# Patient Record
Sex: FEMALE | Race: OTHER | Employment: STUDENT | URBAN - METROPOLITAN AREA
[De-identification: names, ages, dates, MRNs, and addresses within clinical notes are randomized per-mention and may not be internally consistent; named-entity substitution may affect disease eponyms.]

---
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
## 2022-02-02 ENCOUNTER — LAB ENCOUNTER (OUTPATIENT)
Dept: LAB | Facility: HOSPITAL | Age: 22
End: 2022-02-02
Attending: INTERNAL MEDICINE
Payer: COMMERCIAL

## 2022-02-02 ENCOUNTER — OFFICE VISIT (OUTPATIENT)
Dept: RHEUMATOLOGY | Facility: CLINIC | Age: 22
End: 2022-02-02
Payer: COMMERCIAL

## 2022-02-02 VITALS
DIASTOLIC BLOOD PRESSURE: 78 MMHG | BODY MASS INDEX: 17.42 KG/M2 | HEART RATE: 86 BPM | SYSTOLIC BLOOD PRESSURE: 116 MMHG | HEIGHT: 64.25 IN | WEIGHT: 102 LBS

## 2022-02-02 DIAGNOSIS — T69.1XXA CHILBLAINS, INITIAL ENCOUNTER: ICD-10-CM

## 2022-02-02 DIAGNOSIS — I73.00 RAYNAUD'S DISEASE WITHOUT GANGRENE: ICD-10-CM

## 2022-02-02 DIAGNOSIS — T69.1XXA CHILBLAINS, INITIAL ENCOUNTER: Primary | ICD-10-CM

## 2022-02-02 DIAGNOSIS — R76.8 POSITIVE ANA (ANTINUCLEAR ANTIBODY): ICD-10-CM

## 2022-02-02 LAB
ALBUMIN SERPL-MCNC: 4.3 G/DL (ref 3.4–5)
ALBUMIN/GLOB SERPL: 1.2 {RATIO} (ref 1–2)
ALP LIVER SERPL-CCNC: 58 U/L
ALT SERPL-CCNC: 18 U/L
ANION GAP SERPL CALC-SCNC: 4 MMOL/L (ref 0–18)
AST SERPL-CCNC: 19 U/L (ref 15–37)
BILIRUB SERPL-MCNC: 0.3 MG/DL (ref 0.1–2)
BUN BLD-MCNC: 8 MG/DL (ref 7–18)
BUN/CREAT SERPL: 10.7 (ref 10–20)
C3 SERPL-MCNC: 91.9 MG/DL (ref 90–180)
C4 SERPL-MCNC: 15 MG/DL (ref 10–40)
CALCIUM BLD-MCNC: 9.7 MG/DL (ref 8.5–10.1)
CHLORIDE SERPL-SCNC: 105 MMOL/L (ref 98–112)
CK SERPL-CCNC: 208 U/L
CO2 SERPL-SCNC: 28 MMOL/L (ref 21–32)
CREAT BLD-MCNC: 0.75 MG/DL
GLOBULIN PLAS-MCNC: 3.6 G/DL (ref 2.8–4.4)
HCV AB SERPL QL IA: NONREACTIVE
OSMOLALITY SERPL CALC.SUM OF ELEC: 281 MOSM/KG (ref 275–295)
POTASSIUM SERPL-SCNC: 3.9 MMOL/L (ref 3.5–5.1)
PROT SERPL-MCNC: 7.9 G/DL (ref 6.4–8.2)
SODIUM SERPL-SCNC: 137 MMOL/L (ref 136–145)

## 2022-02-02 PROCEDURE — 86235 NUCLEAR ANTIGEN ANTIBODY: CPT

## 2022-02-02 PROCEDURE — 87389 HIV-1 AG W/HIV-1&-2 AB AG IA: CPT

## 2022-02-02 PROCEDURE — 86160 COMPLEMENT ANTIGEN: CPT | Performed by: INTERNAL MEDICINE

## 2022-02-02 PROCEDURE — 86803 HEPATITIS C AB TEST: CPT | Performed by: INTERNAL MEDICINE

## 2022-02-02 PROCEDURE — 99244 OFF/OP CNSLTJ NEW/EST MOD 40: CPT | Performed by: INTERNAL MEDICINE

## 2022-02-02 PROCEDURE — 80053 COMPREHEN METABOLIC PANEL: CPT

## 2022-02-02 PROCEDURE — 82550 ASSAY OF CK (CPK): CPT

## 2022-02-02 PROCEDURE — 86225 DNA ANTIBODY NATIVE: CPT

## 2022-02-02 PROCEDURE — 3074F SYST BP LT 130 MM HG: CPT | Performed by: INTERNAL MEDICINE

## 2022-02-02 PROCEDURE — 83516 IMMUNOASSAY NONANTIBODY: CPT

## 2022-02-02 PROCEDURE — 36415 COLL VENOUS BLD VENIPUNCTURE: CPT

## 2022-02-02 PROCEDURE — 3008F BODY MASS INDEX DOCD: CPT | Performed by: INTERNAL MEDICINE

## 2022-02-02 PROCEDURE — 86036 ANCA SCREEN EACH ANTIBODY: CPT

## 2022-02-02 PROCEDURE — 3078F DIAST BP <80 MM HG: CPT | Performed by: INTERNAL MEDICINE

## 2022-02-02 RX ORDER — ACYCLOVIR 400 MG/1
TABLET ORAL
COMMUNITY
Start: 2022-01-06

## 2022-02-02 RX ORDER — METHYLPHENIDATE HYDROCHLORIDE 18 MG/1
TABLET ORAL
COMMUNITY
Start: 2022-01-25

## 2022-02-02 NOTE — PATIENT INSTRUCTIONS
You were seen today for a positive STEPHANIE, Pernio and Raynaud's  Going to get some more blood work to evaluate for autoimmune causes

## 2022-02-04 LAB — DSDNA AB TITR SER: <10 {TITER}

## 2022-02-05 LAB
JO-1 (HISTIDY1-TRNA SYNTHETASE) AB, IGG: 0 AU/ML
MYELOPEROX ANTIBODIES, IGG: 15 AU/ML
SCLERODERMA (SCL-70) (ENA) AB, IGG: 0 AU/ML
SERINE PROTEASE 3, IGG: 1 AU/ML

## 2022-02-09 LAB
ENA SM IGG SER QL: NEGATIVE
ENA SM+RNP AB SER QL: NEGATIVE
ENA SS-A AB SER QL IA: NEGATIVE
ENA SS-B AB SER QL IA: NEGATIVE

## 2022-02-11 ENCOUNTER — TELEPHONE (OUTPATIENT)
Dept: RHEUMATOLOGY | Facility: CLINIC | Age: 22
End: 2022-02-11

## 2022-02-15 ENCOUNTER — LAB ENCOUNTER (OUTPATIENT)
Dept: LAB | Facility: HOSPITAL | Age: 22
End: 2022-02-15
Attending: INTERNAL MEDICINE
Payer: COMMERCIAL

## 2022-02-15 DIAGNOSIS — T69.1XXA CHILBLAINS, INITIAL ENCOUNTER: ICD-10-CM

## 2022-02-15 DIAGNOSIS — R76.8 POSITIVE ANA (ANTINUCLEAR ANTIBODY): ICD-10-CM

## 2022-02-15 DIAGNOSIS — I73.00 RAYNAUD'S DISEASE WITHOUT GANGRENE: ICD-10-CM

## 2022-02-15 PROCEDURE — 86235 NUCLEAR ANTIGEN ANTIBODY: CPT

## 2022-02-15 PROCEDURE — 36415 COLL VENOUS BLD VENIPUNCTURE: CPT

## 2022-02-15 PROCEDURE — 83516 IMMUNOASSAY NONANTIBODY: CPT

## 2022-03-01 LAB
EJ (GLYCYL - TRNA SYNTHETASE) ANTIBODY: NEGATIVE
FIBRILLARIN (U3 RNP) AB, IGG: NEGATIVE
JO-1 (HISTIDY1-TRNA SYNTHETASE) AB, IGG: 0 AU/ML
KU ANTIBODY: NEGATIVE
MDA5 (CADM-140) ANTIBODY: NEGATIVE
MI-2 (NUCLEAR HELICASE PROTEIN) ANTIBODY: NEGATIVE
NXP-2 (NUCLEAR MATRIX PROTEIN-2) AB: NEGATIVE
OJ (ISOLEUCYL-TRNA SYNTHETASE) ANTIBODY: NEGATIVE
P155/140 (TIF-1 GAMMA) ANTIBODY: NEGATIVE
PL-12 (ALANYL-TRNA SYNTHETASE) ANTIBODY: NEGATIVE
PL-7 (THREONYL-TRNA SYNTHETASE) ANTIBODY: NEGATIVE
PM_SCL 100 ANTIBODY, IGG: NEGATIVE
RIBONUCLEIC PROTEIN (U1) (ENA) AB, IGG: 2 UNITS
SAE1 (SUMO ACTIVATING ENZYME) ANTIBODY: NEGATIVE
SRP (SINGLE RECOGNITION PARTICLE) AB: NEGATIVE
SSA 52 (RO) (ENA) ANTIBODY, IGG: 0 AU/ML
SSA 60 (RO) (ENA) ANTIBODY, IGG: 0 AU/ML
TIF1-GAMMA ANTIBODY: NEGATIVE

## 2022-03-11 ENCOUNTER — TELEPHONE (OUTPATIENT)
Dept: RHEUMATOLOGY | Facility: CLINIC | Age: 22
End: 2022-03-11

## 2022-04-08 ENCOUNTER — OFFICE VISIT (OUTPATIENT)
Dept: INTERNAL MEDICINE CLINIC | Facility: CLINIC | Age: 22
End: 2022-04-08
Payer: COMMERCIAL

## 2022-04-08 VITALS
RESPIRATION RATE: 14 BRPM | HEIGHT: 64.25 IN | BODY MASS INDEX: 17.42 KG/M2 | TEMPERATURE: 98 F | DIASTOLIC BLOOD PRESSURE: 66 MMHG | HEART RATE: 78 BPM | WEIGHT: 102 LBS | SYSTOLIC BLOOD PRESSURE: 104 MMHG

## 2022-04-08 DIAGNOSIS — M77.8 RIGHT WRIST TENDINITIS: Primary | ICD-10-CM

## 2022-04-08 PROCEDURE — 99213 OFFICE O/P EST LOW 20 MIN: CPT | Performed by: INTERNAL MEDICINE

## 2022-04-08 PROCEDURE — 3074F SYST BP LT 130 MM HG: CPT | Performed by: INTERNAL MEDICINE

## 2022-04-08 PROCEDURE — 3078F DIAST BP <80 MM HG: CPT | Performed by: INTERNAL MEDICINE

## 2022-04-08 PROCEDURE — 3008F BODY MASS INDEX DOCD: CPT | Performed by: INTERNAL MEDICINE

## 2022-04-08 RX ORDER — CHLORAL HYDRATE 500 MG
1 CAPSULE ORAL DAILY
COMMUNITY

## 2022-04-08 RX ORDER — MELOXICAM 15 MG/1
15 TABLET ORAL DAILY
Qty: 15 TABLET | Refills: 0 | Status: SHIPPED | OUTPATIENT
Start: 2022-04-08

## 2022-04-08 NOTE — PATIENT INSTRUCTIONS
Take meloxicam daily for 1 week. Do not take any ibuprofen/advil/aspirin while taking the medication.  Take it with food    Try the wrist exercises and wear the hand splint

## 2022-05-09 ENCOUNTER — TELEPHONE (OUTPATIENT)
Dept: ORTHOPEDICS CLINIC | Facility: CLINIC | Age: 22
End: 2022-05-09

## 2022-05-09 ENCOUNTER — OFFICE VISIT (OUTPATIENT)
Dept: INTERNAL MEDICINE CLINIC | Facility: CLINIC | Age: 22
End: 2022-05-09
Payer: COMMERCIAL

## 2022-05-09 VITALS
DIASTOLIC BLOOD PRESSURE: 56 MMHG | WEIGHT: 102 LBS | OXYGEN SATURATION: 98 % | RESPIRATION RATE: 12 BRPM | HEART RATE: 74 BPM | HEIGHT: 64.75 IN | SYSTOLIC BLOOD PRESSURE: 93 MMHG | BODY MASS INDEX: 17.2 KG/M2 | TEMPERATURE: 99 F

## 2022-05-09 DIAGNOSIS — M77.8 RIGHT WRIST TENDINITIS: ICD-10-CM

## 2022-05-09 DIAGNOSIS — R73.03 PREDIABETES: ICD-10-CM

## 2022-05-09 DIAGNOSIS — Z00.00 ROUTINE PHYSICAL EXAMINATION: Primary | ICD-10-CM

## 2022-05-09 LAB — CARTRIDGE LOT#: NORMAL NUMERIC

## 2022-05-09 RX ORDER — MELOXICAM 15 MG/1
15 TABLET ORAL DAILY
Qty: 30 TABLET | Refills: 0 | Status: SHIPPED | OUTPATIENT
Start: 2022-05-09

## 2022-05-09 NOTE — TELEPHONE ENCOUNTER
Please enter an [address] for a RIGHT WRIST for  this patient's upcoming appointment, as the patient has not had any imaging completed. Patient was instructed to get X-rays before appt. PLEASE REPLY TO THIS MESSAGE when the order is put in EPIC so that I can schedule the Xray appt. Thank you, in advance!       Future Appointments   Date Time Provider Srinivasan Melvin   5/10/2022 10:20 AM ALLEGRA Henry [facility]

## 2022-05-09 NOTE — TELEPHONE ENCOUNTER
Reviewed patients chart, xray orders are required. Order placed for rt wrist xrays.  Please contact patient advise to arrive 30 mins prior to patients appt to complete x-ray order and schedule patients xray appt-Thank you

## 2022-05-09 NOTE — PATIENT INSTRUCTIONS
Please schedule an appointment with Leif Armendariz   Continue the home exercises and only take meloxicam as needed    See you back in 1 year

## 2022-05-10 ENCOUNTER — HOSPITAL ENCOUNTER (OUTPATIENT)
Dept: GENERAL RADIOLOGY | Age: 22
Discharge: HOME | End: 2022-05-10
Attending: PHYSICIAN ASSISTANT
Payer: COMMERCIAL

## 2022-05-10 ENCOUNTER — OFFICE VISIT (OUTPATIENT)
Dept: ORTHOPEDICS CLINIC | Facility: CLINIC | Age: 22
End: 2022-05-10
Payer: COMMERCIAL

## 2022-05-10 VITALS — WEIGHT: 102 LBS | HEIGHT: 64.75 IN | BODY MASS INDEX: 17.2 KG/M2

## 2022-05-10 DIAGNOSIS — M65.4 TENOSYNOVITIS OF RADIAL STYLOID: Primary | ICD-10-CM

## 2022-05-10 DIAGNOSIS — M25.531 RIGHT WRIST PAIN: ICD-10-CM

## 2022-05-10 PROCEDURE — 99204 OFFICE O/P NEW MOD 45 MIN: CPT | Performed by: PHYSICIAN ASSISTANT

## 2022-05-10 PROCEDURE — 73110 X-RAY EXAM OF WRIST: CPT | Performed by: PHYSICIAN ASSISTANT

## 2022-05-10 PROCEDURE — 20550 NJX 1 TENDON SHEATH/LIGAMENT: CPT | Performed by: PHYSICIAN ASSISTANT

## 2022-05-10 PROCEDURE — 3008F BODY MASS INDEX DOCD: CPT | Performed by: PHYSICIAN ASSISTANT

## 2022-05-10 RX ORDER — BETAMETHASONE SODIUM PHOSPHATE AND BETAMETHASONE ACETATE 3; 3 MG/ML; MG/ML
6 INJECTION, SUSPENSION INTRA-ARTICULAR; INTRALESIONAL; INTRAMUSCULAR; SOFT TISSUE ONCE
Status: COMPLETED | OUTPATIENT
Start: 2022-05-10 | End: 2022-05-10

## 2022-05-10 RX ORDER — BETAMETHASONE SODIUM PHOSPHATE AND BETAMETHASONE ACETATE 3; 3 MG/ML; MG/ML
12 INJECTION, SUSPENSION INTRA-ARTICULAR; INTRALESIONAL; INTRAMUSCULAR; SOFT TISSUE ONCE
Status: DISCONTINUED | OUTPATIENT
Start: 2022-05-10 | End: 2022-05-10

## 2022-05-10 RX ADMIN — BETAMETHASONE SODIUM PHOSPHATE AND BETAMETHASONE ACETATE 6 MG: 3; 3 INJECTION, SUSPENSION INTRA-ARTICULAR; INTRALESIONAL; INTRAMUSCULAR; SOFT TISSUE at 11:28:00

## 2022-06-27 ENCOUNTER — PATIENT MESSAGE (OUTPATIENT)
Dept: INTERNAL MEDICINE CLINIC | Facility: CLINIC | Age: 22
End: 2022-06-27

## 2022-06-27 RX ORDER — ACYCLOVIR 400 MG/1
400 TABLET ORAL 2 TIMES DAILY
Qty: 20 TABLET | Refills: 0 | Status: SHIPPED | OUTPATIENT
Start: 2022-06-27

## 2022-06-27 NOTE — TELEPHONE ENCOUNTER
From: Sidney Rousseau  To: [address] [place] [address]  Sent: 6/27/2022 8:25 AM CDT  Subject: acyclovir 400mg refill    hello, my current acycl[facility]. Instead of having to schedule an appointment with them, I wanted to know if you could refill this prescription.

## 2022-09-09 NOTE — TELEPHONE ENCOUNTER
Called patient back and discussed results with her. She has biopsy-proven hernia and evaluate for autoimmune cause. Blood work was negative. She states her symptoms have improved. Also has Raynaud's. Advised that we can try calcium channel blocker. She states that she will hold off right now.   She will follow-up in 6 to 8 months
Patient is requesting a call to discuss lab results from 2/15.  Patient is not sure what her next step should be
Please see below. Pt did review results via My Chart.
Detail Level: Generalized
Detail Level: Zone
Detail Level: Detailed

## 2023-02-06 ENCOUNTER — TELEPHONE (OUTPATIENT)
Dept: INTERNAL MEDICINE CLINIC | Facility: CLINIC | Age: 23
End: 2023-02-06

## 2023-02-06 NOTE — TELEPHONE ENCOUNTER
Pt scheduled VV for ADHD med refill, ok to keep as a VV or would you like to see pt in office ?     Last OV 5/9/22    Future Appointments   Date Time Provider Srinivasan Melvin   2/14/2023 10:30 AM Zander Mcgowan,  [facility] [place]

## 2023-02-14 PROBLEM — F90.9 ATTENTION DEFICIT HYPERACTIVITY DISORDER (ADHD): Status: ACTIVE | Noted: 2023-02-14

## 2023-10-12 ENCOUNTER — OFFICE VISIT (OUTPATIENT)
Dept: INTERNAL MEDICINE CLINIC | Facility: CLINIC | Age: 23
End: 2023-10-12
Payer: COMMERCIAL

## 2023-10-12 ENCOUNTER — LAB ENCOUNTER (OUTPATIENT)
Dept: LAB | Age: 23
End: 2023-10-12
Attending: INTERNAL MEDICINE
Payer: COMMERCIAL

## 2023-10-12 VITALS
OXYGEN SATURATION: 100 % | RESPIRATION RATE: 14 BRPM | DIASTOLIC BLOOD PRESSURE: 64 MMHG | WEIGHT: 110.19 LBS | SYSTOLIC BLOOD PRESSURE: 106 MMHG | TEMPERATURE: 98 F | BODY MASS INDEX: 18.58 KG/M2 | HEIGHT: 64.75 IN | HEART RATE: 84 BPM

## 2023-10-12 DIAGNOSIS — M79.642 BILATERAL HAND PAIN: ICD-10-CM

## 2023-10-12 DIAGNOSIS — Z11.3 SCREENING EXAMINATION FOR STD (SEXUALLY TRANSMITTED DISEASE): ICD-10-CM

## 2023-10-12 DIAGNOSIS — Z00.00 ROUTINE PHYSICAL EXAMINATION: Primary | ICD-10-CM

## 2023-10-12 DIAGNOSIS — M79.641 BILATERAL HAND PAIN: ICD-10-CM

## 2023-10-12 DIAGNOSIS — Z12.4 SCREENING FOR CERVICAL CANCER: ICD-10-CM

## 2023-10-12 DIAGNOSIS — E55.9 VITAMIN D DEFICIENCY: ICD-10-CM

## 2023-10-12 LAB
CRP SERPL-MCNC: <0.29 MG/DL (ref ?–0.3)
ERYTHROCYTE [SEDIMENTATION RATE] IN BLOOD: 38 MM/HR
HBV SURFACE AG SER-ACNC: <0.1 [IU]/L
HBV SURFACE AG SERPL QL IA: NONREACTIVE
HCV AB SERPL QL IA: NONREACTIVE
RHEUMATOID FACT SERPL-ACNC: <10 IU/ML (ref ?–15)
T PALLIDUM AB SER QL IA: NONREACTIVE
VIT D+METAB SERPL-MCNC: 42.1 NG/ML (ref 30–100)

## 2023-10-12 PROCEDURE — 86200 CCP ANTIBODY: CPT | Performed by: INTERNAL MEDICINE

## 2023-10-12 PROCEDURE — 90471 IMMUNIZATION ADMIN: CPT | Performed by: INTERNAL MEDICINE

## 2023-10-12 PROCEDURE — 87591 N.GONORRHOEAE DNA AMP PROB: CPT | Performed by: INTERNAL MEDICINE

## 2023-10-12 PROCEDURE — 86431 RHEUMATOID FACTOR QUANT: CPT | Performed by: INTERNAL MEDICINE

## 2023-10-12 PROCEDURE — 86803 HEPATITIS C AB TEST: CPT | Performed by: INTERNAL MEDICINE

## 2023-10-12 PROCEDURE — 99395 PREV VISIT EST AGE 18-39: CPT | Performed by: INTERNAL MEDICINE

## 2023-10-12 PROCEDURE — 99213 OFFICE O/P EST LOW 20 MIN: CPT | Performed by: INTERNAL MEDICINE

## 2023-10-12 PROCEDURE — 3008F BODY MASS INDEX DOCD: CPT | Performed by: INTERNAL MEDICINE

## 2023-10-12 PROCEDURE — 87340 HEPATITIS B SURFACE AG IA: CPT | Performed by: INTERNAL MEDICINE

## 2023-10-12 PROCEDURE — 86780 TREPONEMA PALLIDUM: CPT | Performed by: INTERNAL MEDICINE

## 2023-10-12 PROCEDURE — 87389 HIV-1 AG W/HIV-1&-2 AB AG IA: CPT | Performed by: INTERNAL MEDICINE

## 2023-10-12 PROCEDURE — 3074F SYST BP LT 130 MM HG: CPT | Performed by: INTERNAL MEDICINE

## 2023-10-12 PROCEDURE — 85652 RBC SED RATE AUTOMATED: CPT | Performed by: INTERNAL MEDICINE

## 2023-10-12 PROCEDURE — 87491 CHLMYD TRACH DNA AMP PROBE: CPT | Performed by: INTERNAL MEDICINE

## 2023-10-12 PROCEDURE — 86140 C-REACTIVE PROTEIN: CPT | Performed by: INTERNAL MEDICINE

## 2023-10-12 PROCEDURE — 3078F DIAST BP <80 MM HG: CPT | Performed by: INTERNAL MEDICINE

## 2023-10-12 PROCEDURE — 82306 VITAMIN D 25 HYDROXY: CPT | Performed by: INTERNAL MEDICINE

## 2023-10-12 PROCEDURE — 90686 IIV4 VACC NO PRSV 0.5 ML IM: CPT | Performed by: INTERNAL MEDICINE

## 2023-10-12 NOTE — PATIENT INSTRUCTIONS
I have ordered blood tests for you    You received the flu vaccine today.  May cause some soreness of the arm    Check to see if you have received the Tetanus vaccine in the last 10 years    Please have blood tests done    We did the pap today and may take a few days for the results to come back    I will contact you with the results     Please take 2000 IU of vitamin D daily

## 2023-10-13 LAB
C TRACH DNA SPEC QL NAA+PROBE: NEGATIVE
CCP IGG SERPL-ACNC: 3.8 U/ML (ref 0–6.9)
N GONORRHOEA DNA SPEC QL NAA+PROBE: NEGATIVE

## 2023-10-16 DIAGNOSIS — M25.541 ARTHRALGIA OF BOTH HANDS: Primary | ICD-10-CM

## 2023-10-16 DIAGNOSIS — M25.542 ARTHRALGIA OF BOTH HANDS: Primary | ICD-10-CM

## 2023-10-16 RX ORDER — PREDNISONE 10 MG/1
TABLET ORAL
Qty: 21 TABLET | Refills: 0 | Status: SHIPPED | OUTPATIENT
Start: 2023-10-16

## 2023-10-18 LAB
.: ABNORMAL
.: ABNORMAL

## 2023-10-19 PROBLEM — R87.612 LGSIL ON PAP SMEAR OF CERVIX: Status: ACTIVE | Noted: 2023-10-19

## 2023-12-12 ENCOUNTER — OFFICE VISIT (OUTPATIENT)
Dept: INTERNAL MEDICINE CLINIC | Facility: CLINIC | Age: 23
End: 2023-12-12
Payer: COMMERCIAL

## 2023-12-12 VITALS
DIASTOLIC BLOOD PRESSURE: 60 MMHG | WEIGHT: 110 LBS | HEIGHT: 64.75 IN | HEART RATE: 72 BPM | TEMPERATURE: 98 F | OXYGEN SATURATION: 99 % | SYSTOLIC BLOOD PRESSURE: 108 MMHG | RESPIRATION RATE: 14 BRPM | BODY MASS INDEX: 18.55 KG/M2

## 2023-12-12 DIAGNOSIS — R87.612 LGSIL ON PAP SMEAR OF CERVIX: ICD-10-CM

## 2023-12-12 DIAGNOSIS — I73.00 RAYNAUD'S PHENOMENON WITHOUT GANGRENE: Primary | ICD-10-CM

## 2023-12-12 PROCEDURE — 3078F DIAST BP <80 MM HG: CPT | Performed by: INTERNAL MEDICINE

## 2023-12-12 PROCEDURE — 99214 OFFICE O/P EST MOD 30 MIN: CPT | Performed by: INTERNAL MEDICINE

## 2023-12-12 PROCEDURE — 90471 IMMUNIZATION ADMIN: CPT | Performed by: INTERNAL MEDICINE

## 2023-12-12 PROCEDURE — 90715 TDAP VACCINE 7 YRS/> IM: CPT | Performed by: INTERNAL MEDICINE

## 2023-12-12 PROCEDURE — 3074F SYST BP LT 130 MM HG: CPT | Performed by: INTERNAL MEDICINE

## 2023-12-12 PROCEDURE — 3008F BODY MASS INDEX DOCD: CPT | Performed by: INTERNAL MEDICINE

## 2023-12-12 RX ORDER — AMLODIPINE BESYLATE 5 MG/1
2.5 TABLET ORAL DAILY
Qty: 15 TABLET | Refills: 0 | Status: SHIPPED | OUTPATIENT
Start: 2023-12-12 | End: 2024-12-06

## 2023-12-12 NOTE — PATIENT INSTRUCTIONS
Take half a tablet of the medication provided for 1 month and let me know if any improvement in your symptoms.   If no improvement I also recommend following up with your rheumatologist    You received the tetanus vaccine today and it may cause soreness for 24 hours for which you may take some ibuprofen 400 mg or Tylenol 500 mg

## 2023-12-13 DIAGNOSIS — I73.00 RAYNAUD'S PHENOMENON WITHOUT GANGRENE: ICD-10-CM

## 2023-12-13 RX ORDER — AMLODIPINE BESYLATE 5 MG/1
2.5 TABLET ORAL DAILY
Qty: 45 TABLET | Refills: 0 | OUTPATIENT
Start: 2023-12-13 | End: 2024-12-07

## 2024-01-14 ENCOUNTER — PATIENT MESSAGE (OUTPATIENT)
Dept: INTERNAL MEDICINE CLINIC | Facility: CLINIC | Age: 24
End: 2024-01-14

## 2024-01-14 DIAGNOSIS — M77.8 RIGHT WRIST TENDINITIS: Primary | ICD-10-CM

## 2024-01-15 NOTE — TELEPHONE ENCOUNTER
From: Maude Dougherty  To: Holly Flores  Sent: 1/14/2024 7:45 AM CST  Subject: Shoulder pain     Hello, I am still experiencing pretty intense pain in my shoulder/arm and weakness during the night and morning. I think it must again be due to inflammation - I’m not sure if it is bursitis/tendonitis but I think I need another cortisol shot. Can you help me with this referral?

## 2024-02-05 ENCOUNTER — LAB ENCOUNTER (OUTPATIENT)
Dept: LAB | Facility: HOSPITAL | Age: 24
End: 2024-02-05
Attending: INTERNAL MEDICINE
Payer: COMMERCIAL

## 2024-02-05 ENCOUNTER — OFFICE VISIT (OUTPATIENT)
Dept: RHEUMATOLOGY | Facility: CLINIC | Age: 24
End: 2024-02-05

## 2024-02-05 VITALS
SYSTOLIC BLOOD PRESSURE: 118 MMHG | HEIGHT: 64.75 IN | DIASTOLIC BLOOD PRESSURE: 78 MMHG | WEIGHT: 111 LBS | HEART RATE: 101 BPM | BODY MASS INDEX: 18.72 KG/M2

## 2024-02-05 DIAGNOSIS — T69.1XXA CHILBLAINS, INITIAL ENCOUNTER: ICD-10-CM

## 2024-02-05 DIAGNOSIS — M35.9 DIFFUSE DISEASE OF CONNECTIVE TISSUE (HCC): ICD-10-CM

## 2024-02-05 DIAGNOSIS — M35.9 UNDIFFERENTIATED CONNECTIVE TISSUE DISEASE (HCC): ICD-10-CM

## 2024-02-05 DIAGNOSIS — R76.8 POSITIVE ANA (ANTINUCLEAR ANTIBODY): Primary | ICD-10-CM

## 2024-02-05 DIAGNOSIS — I73.00 RAYNAUD'S SYNDROME: Primary | ICD-10-CM

## 2024-02-05 DIAGNOSIS — I73.00 RAYNAUD'S DISEASE WITHOUT GANGRENE: ICD-10-CM

## 2024-02-05 LAB
BILIRUB UR QL: NEGATIVE
GLUCOSE UR-MCNC: NORMAL MG/DL
HGB UR QL STRIP.AUTO: NEGATIVE
KETONES UR-MCNC: 10 MG/DL
LEUKOCYTE ESTERASE UR QL STRIP.AUTO: NEGATIVE
NITRITE UR QL STRIP.AUTO: NEGATIVE
PH UR: 7 [PH] (ref 5–8)
PROT UR-MCNC: NEGATIVE MG/DL
SP GR UR STRIP: 1.01 (ref 1–1.03)
UROBILINOGEN UR STRIP-ACNC: NORMAL

## 2024-02-05 PROCEDURE — 3074F SYST BP LT 130 MM HG: CPT | Performed by: INTERNAL MEDICINE

## 2024-02-05 PROCEDURE — 99214 OFFICE O/P EST MOD 30 MIN: CPT | Performed by: INTERNAL MEDICINE

## 2024-02-05 PROCEDURE — 81001 URINALYSIS AUTO W/SCOPE: CPT | Performed by: INTERNAL MEDICINE

## 2024-02-05 PROCEDURE — 3008F BODY MASS INDEX DOCD: CPT | Performed by: INTERNAL MEDICINE

## 2024-02-05 PROCEDURE — 3078F DIAST BP <80 MM HG: CPT | Performed by: INTERNAL MEDICINE

## 2024-02-05 RX ORDER — PREDNISONE 10 MG/1
TABLET ORAL
Qty: 20 TABLET | Refills: 0 | Status: SHIPPED
Start: 2024-02-05

## 2024-02-05 NOTE — PROGRESS NOTES
Maude Dougherty is a 23 year old female.    HPI:     Chief Complaint   Patient presents with    Joint Pain    Hand Pain       I had the pleasure of seeing Maude Dougherty on 2/5/2024 for follow up +STEPHANIE, chillblains, RP and now worsening joint pain      Current Medications:  Meloxicam as needed   Blood work:  +STEPHANIE 1:80 speckled pattern, neg LEWIS panel  +cryoglobulin, cryocrit 0.3%, +IgG, IgA, IgM, kappa and C3  Neg RF, CCP, APL panel, SPEP, ANCA, myositis panel  Neg cryofibrinigon, cold hemaglutins, monoclonal protein    Interval History:  This is a 20 yo F with no known medical history referred for a +STEPHANIE.  She was following with dermatology due to rash on her hands that developed around November.  It was mostly in her right hand on the palmar aspect of there fingers.  She had a biopsy done that was consistent with perniosis. She reports having some pain with the lesions mostly in the morning. Did not notice if cold weather worsened it.   She also developed Raynaud's symptoms about 1 month ago.  It is induced by the cold.  About a year ago in December 2020 she also had erythema on her nose and her cheeks.  It lasted a few weeks.  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  She currently is on valacyclovir twice a day.  This happened in November.  Denies any or symptoms of sicca, hair loss, lymphadenopathy, fever chills, serositis, DVT or PE, miscarriages, chest pain or shortness of breath.  No family history of autoimmune diseases.  She was found to have a positive STEPHANIE 1: 80 speckled pattern and + cryoglobulin.  Denies any petechia or purpura lesions.  No history of hepatitis C or HIV.  Denies any joint pain or swelling    2/5/2024:  Presents for f/u of +STEPHANIE, chillblains/perniosis, Raynaunds  Having more joint pain ninfa at night and in the morning. Feels very stiff, at time hard to move the covers  R hand can become swollen  Has a hx of right wrist tendonitis over 2 years ago and received an  injection which helped, this was in May 2022  Has had pain in the shoulders  Knees can hurt in the morning but not severe  No psoriasis but does have a dry scalp  No sores in the mouth, hair loss. Some dry eyes  Taking meloxicam as needed, helps slightly   Recent blood work showed elevated ESR 38          HISTORY:  No past medical history on file.   Social Hx Reviewed   Family Hx Reviewed     Medications (Active prior to today's visit):  Current Outpatient Medications   Medication Sig Dispense Refill    amphetamine-dextroamphetamine 20 MG Oral Tab Take 0.5 tablet (10 mg) by mouth 2 (two) times daily with meals (Patient not taking: Reported on 2/5/2024) 30 tablet 0    [START ON 11/18/2024] amphetamine-dextroamphetamine (ADDERALL) 10 MG Oral Tab Take 1 tablet (10 mg total) by mouth 2 (two) times daily. 60 tablet 0    Multiple Vitamins-Minerals (WOMENS MULTIVITAMIN) Oral Tab Take 1 tablet by mouth daily.      Omega-3 1000 MG Oral Cap Take 1 capsule by mouth daily.      MAGNESIUM OR Take 1 tablet by mouth daily.      amphetamine-dextroamphetamine (ADDERALL) 10 MG Oral Tab Take 1 tablet (10 mg total) by mouth 2 (two) times daily with meals. (Patient not taking: Reported on 2/5/2024) 60 tablet 0    amLODIPine 5 MG Oral Tab Take 0.5 tablets (2.5 mg total) by mouth daily. (Patient not taking: Reported on 2/5/2024) 15 tablet 0    amphetamine-dextroamphetamine (ADDERALL) 10 MG Oral Tab Take 1 tablet (10 mg total) by mouth 2 (two) times daily. (Patient not taking: Reported on 2/5/2024) 60 tablet 0    [START ON 2/12/2024] amphetamine-dextroamphetamine (ADDERALL) 10 MG Oral Tab Take 1 tablet (10 mg total) by mouth 2 (two) times daily. (Patient not taking: Reported on 2/5/2024) 60 tablet 0    Lysine 1000 MG Oral Tab Take 1 tablet by mouth daily. (Patient not taking: Reported on 2/5/2024)       .cmed  Allergies:  No Known Allergies      ROS:   All other ROS are negative.     PHYSICAL EXAM:   GEN: AAOx3, NAD  HEENT: EOMI, PERRLA,  no injection or icterus, oral mucosa moist, no oral lesions. No lymphadenopathy. No facial rash  CVS: RRR, no murmurs rubs or gallops. Equal 2+ distal pulses.   LUNGS: CTAB, no increased work of breathing  ABDOMEN:  soft NT/ND, +BS, no HSM  SKIN: No rashes or skin lesions. No nail findings  MSK:  R hand palmar aspect small erythematous lesions, nontender    NEURO: Cranial nerves II-XII intact grossly. 5/5 strength throughout in both upper and lower extremities, sensation intact.  PSYCH: normal mood       LABS:     Component      Latest Ref Rng 2/2/2022   MYELOPEROX ANTIBODIES, IGG      0 - 19 AU/mL 15    SERINE PROTEASE3, IGG      0 - 19 AU/mL 1    ANCA IFA Titer      <1:20  <1:20    ANCA IFA Pattern      None Detected  None Detected    COMPLEMENT C4      10.0 - 40.0 mg/dL 15.0    COMPLEMENT C3      90.0 - 180.0 mg/dL 91.9    Anti Double Strand DNA      <10  <10    Anti-Rogers/RNP Antibody      Negative  Negative    Anti-Smith Antibody      Negative  Negative    Anti-Sjogren's A      Negative  Negative    Anti-Sjogren's B      Negative  Negative    Scleroderma (Scl-70) (LEWIS) Antibody, IgG      0 - 40 AU/mL 0    CK      26 - 192 U/L 208 (H)       Component      Latest Ref Rng 10/12/2023   RHEUMATOID FACTOR      <15 IU/mL <10    C-Citrullinated Peptide IgG AB      0.0 - 6.9 U/mL 3.8    SED RATE      0 - 20 mm/Hr 38 (H)    C-REACTIVE PROTEIN      <0.30 mg/dL <0.29           Imaging:     XR R wrist  CONCLUSION:  Negative     ASSESSMENT/PLAN:     UCTD  - She has a positive STEPHANIE, chilblains/perniosis, Raynaud's and worsening joint pain.  Has more pain in her right hand and shoulders.  - Right wrist tendonitis over 2 years ago and received an injection which helped, this was in May 2022  - Plan to repeat blood work, STEPHANIE with LEWIS panel, C3 and C4, urinalysis  - Recent blood work in October 2023 showed negative RF and CCP but mildly elevated ESR of 38  - Plan to start prednisone 3 mg daily for 3 days then 20 mg daily for 3  days then 10 mg daily for 3 days and stop  - Depending the results may consider starting hydroxychloroquine.    Pt will f/u in 2-3 mos     Viktoriya Waldrop MD  2/5/2024   3:16 PM

## 2024-02-05 NOTE — PATIENT INSTRUCTIONS
You were seen positive STEPHANIE, chilblains, Raynaud's and worsening joint pain  Symptoms may be from undifferentiated connective tissue disease which can cause many symptoms  Lets get some more blood work  Try prednisone 3 pills for 3 days then 2 pills for 3 days then 1 pill for 3 days then stop  In the meantime we will likely start you on hydroxychloroquine 1 pill a day but let me get blood work first  Please make a follow-up in the next 3 months

## 2024-02-06 ENCOUNTER — LAB ENCOUNTER (OUTPATIENT)
Dept: LAB | Age: 24
End: 2024-02-06
Attending: INTERNAL MEDICINE
Payer: COMMERCIAL

## 2024-02-06 DIAGNOSIS — R76.8 POSITIVE ANA (ANTINUCLEAR ANTIBODY): ICD-10-CM

## 2024-02-06 DIAGNOSIS — M35.9 UNDIFFERENTIATED CONNECTIVE TISSUE DISEASE (HCC): ICD-10-CM

## 2024-02-06 DIAGNOSIS — I73.00 RAYNAUD'S DISEASE WITHOUT GANGRENE: ICD-10-CM

## 2024-02-06 DIAGNOSIS — I73.00 RAYNAUD'S SYNDROME: ICD-10-CM

## 2024-02-06 DIAGNOSIS — M35.9 DIFFUSE DISEASE OF CONNECTIVE TISSUE (HCC): ICD-10-CM

## 2024-02-06 DIAGNOSIS — T69.1XXA CHILBLAINS, INITIAL ENCOUNTER: ICD-10-CM

## 2024-02-06 LAB
ALBUMIN SERPL-MCNC: 4.1 G/DL (ref 3.4–5)
ALBUMIN/GLOB SERPL: 1.1 {RATIO} (ref 1–2)
ALP LIVER SERPL-CCNC: 68 U/L
ALT SERPL-CCNC: 17 U/L
ANION GAP SERPL CALC-SCNC: 3 MMOL/L (ref 0–18)
AST SERPL-CCNC: 22 U/L (ref 15–37)
BASOPHILS # BLD AUTO: 0.03 X10(3) UL (ref 0–0.2)
BASOPHILS NFR BLD AUTO: 0.8 %
BILIRUB SERPL-MCNC: 0.4 MG/DL (ref 0.1–2)
BUN BLD-MCNC: 11 MG/DL (ref 9–23)
C3 SERPL-MCNC: 75.2 MG/DL (ref 90–180)
C4 SERPL-MCNC: 16 MG/DL (ref 10–40)
CALCIUM BLD-MCNC: 9.8 MG/DL (ref 8.5–10.1)
CHLORIDE SERPL-SCNC: 109 MMOL/L (ref 98–112)
CO2 SERPL-SCNC: 27 MMOL/L (ref 21–32)
CREAT BLD-MCNC: 0.93 MG/DL
EGFRCR SERPLBLD CKD-EPI 2021: 90 ML/MIN/1.73M2 (ref 60–?)
EOSINOPHIL # BLD AUTO: 0.04 X10(3) UL (ref 0–0.7)
EOSINOPHIL NFR BLD AUTO: 1.1 %
ERYTHROCYTE [DISTWIDTH] IN BLOOD BY AUTOMATED COUNT: 12.7 %
FASTING STATUS PATIENT QL REPORTED: NO
GLOBULIN PLAS-MCNC: 3.9 G/DL (ref 2.8–4.4)
GLUCOSE BLD-MCNC: 90 MG/DL (ref 70–99)
HCT VFR BLD AUTO: 41.1 %
HGB BLD-MCNC: 13.4 G/DL
IMM GRANULOCYTES # BLD AUTO: 0.01 X10(3) UL (ref 0–1)
IMM GRANULOCYTES NFR BLD: 0.3 %
LYMPHOCYTES # BLD AUTO: 1.69 X10(3) UL (ref 1–4)
LYMPHOCYTES NFR BLD AUTO: 45.1 %
MCH RBC QN AUTO: 28.3 PG (ref 26–34)
MCHC RBC AUTO-ENTMCNC: 32.6 G/DL (ref 31–37)
MCV RBC AUTO: 86.7 FL
MONOCYTES # BLD AUTO: 0.45 X10(3) UL (ref 0.1–1)
MONOCYTES NFR BLD AUTO: 12 %
NEUTROPHILS # BLD AUTO: 1.53 X10 (3) UL (ref 1.5–7.7)
NEUTROPHILS # BLD AUTO: 1.53 X10(3) UL (ref 1.5–7.7)
NEUTROPHILS NFR BLD AUTO: 40.7 %
OSMOLALITY SERPL CALC.SUM OF ELEC: 287 MOSM/KG (ref 275–295)
PLATELET # BLD AUTO: 201 10(3)UL (ref 150–450)
POTASSIUM SERPL-SCNC: 3.9 MMOL/L (ref 3.5–5.1)
PROT SERPL-MCNC: 8 G/DL (ref 6.4–8.2)
RBC # BLD AUTO: 4.74 X10(6)UL
SODIUM SERPL-SCNC: 139 MMOL/L (ref 136–145)
WBC # BLD AUTO: 3.8 X10(3) UL (ref 4–11)

## 2024-02-06 PROCEDURE — 86225 DNA ANTIBODY NATIVE: CPT

## 2024-02-06 PROCEDURE — 85025 COMPLETE CBC W/AUTO DIFF WBC: CPT

## 2024-02-06 PROCEDURE — 80053 COMPREHEN METABOLIC PANEL: CPT

## 2024-02-06 PROCEDURE — 86038 ANTINUCLEAR ANTIBODIES: CPT

## 2024-02-06 PROCEDURE — 86235 NUCLEAR ANTIGEN ANTIBODY: CPT

## 2024-02-06 PROCEDURE — 86160 COMPLEMENT ANTIGEN: CPT

## 2024-02-06 PROCEDURE — 36415 COLL VENOUS BLD VENIPUNCTURE: CPT

## 2024-02-06 PROCEDURE — 86039 ANTINUCLEAR ANTIBODIES (ANA): CPT

## 2024-02-08 LAB
ANA NUCLEOLAR TITR SER IF: 320 {TITER}
CENTROMERE IGG SER-ACNC: 0.5 U/ML
DSDNA AB TITR SER: 40 {TITER}
ENA JO1 AB SER IA-ACNC: <0.3 U/ML
ENA RNP IGG SER IA-ACNC: 3.5 U/ML
ENA SCL70 IGG SER IA-ACNC: 1.6 U/ML
ENA SM IGG SER IA-ACNC: 0.7 U/ML
ENA SS-A IGG SER IA-ACNC: 0.6 U/ML
ENA SS-B IGG SER IA-ACNC: 0.5 U/ML
NUCLEAR IGG TITR SER IF: POSITIVE {TITER}
U1 SNRNP IGG SER IA-ACNC: 3.1 U/ML

## 2024-02-12 ENCOUNTER — TELEPHONE (OUTPATIENT)
Dept: RHEUMATOLOGY | Facility: CLINIC | Age: 24
End: 2024-02-12

## 2024-02-12 RX ORDER — HYDROXYCHLOROQUINE SULFATE 200 MG/1
200 TABLET, FILM COATED ORAL DAILY
Qty: 90 TABLET | Refills: 0 | Status: SHIPPED | OUTPATIENT
Start: 2024-02-12

## 2024-03-16 ENCOUNTER — PATIENT MESSAGE (OUTPATIENT)
Dept: RHEUMATOLOGY | Facility: CLINIC | Age: 24
End: 2024-03-16

## 2024-03-18 NOTE — TELEPHONE ENCOUNTER
From: Maude Dougherty  To: Viktoriya Waldrop  Sent: 3/16/2024 1:05 PM CDT  Subject: Sending medical records     Good afternoon,   I would like to request to have my labs and notes faxed to [phone] for Doctor Pham at [facility]. Please note that I am not transferring away from your practice and still plan to follow up around May, but would also like her to review these.     Thank you,   Maude Dougherty

## 2024-04-01 ENCOUNTER — PATIENT MESSAGE (OUTPATIENT)
Dept: INTERNAL MEDICINE CLINIC | Facility: CLINIC | Age: 24
End: 2024-04-01

## 2024-04-01 DIAGNOSIS — M77.8 RIGHT WRIST TENDINITIS: ICD-10-CM

## 2024-04-01 RX ORDER — MELOXICAM 15 MG/1
15 TABLET ORAL DAILY PRN
Qty: 30 TABLET | Refills: 0 | Status: SHIPPED | OUTPATIENT
Start: 2024-04-01

## 2024-04-01 NOTE — TELEPHONE ENCOUNTER
From: Maude Dougherty  To: Holly Flores  Sent: 4/1/2024 12:46 PM CDT  Subject: Meloxicam refill    Good afternoon,   Would it be possible to refill my meloxicam prescription? I am still experiencing a lot of joint pain in my shoulders and hands and ibuprofen has not been very effective.   Thanks,   Maude

## 2024-05-14 NOTE — TELEPHONE ENCOUNTER
LOV: 2/5/24  Last Refilled:#90, 0rfs 2/12/24    ASSESSMENT/PLAN:      UCTD  - She has a positive STEPHANIE, chilblains/perniosis, Raynaud's and worsening joint pain.  Has more pain in her right hand and shoulders.  - Right wrist tendonitis over 2 years ago and received an injection which helped, this was in May 2022  - Plan to repeat blood work, STEPHANIE with LEWIS panel, C3 and C4, urinalysis  - Recent blood work in October 2023 showed negative RF and CCP but mildly elevated ESR of 38  - Plan to start prednisone 3 mg daily for 3 days then 20 mg daily for 3 days then 10 mg daily for 3 days and stop  - Depending the results may consider starting hydroxychloroquine.     Pt will f/u in 2-3 mos      Viktoriya Waldrop MD  2/5/2024   3:16 PM  Please advise.

## 2024-05-15 RX ORDER — HYDROXYCHLOROQUINE SULFATE 200 MG/1
200 TABLET, FILM COATED ORAL DAILY
Qty: 90 TABLET | Refills: 0 | Status: SHIPPED | OUTPATIENT
Start: 2024-05-15

## 2024-06-11 ENCOUNTER — TELEPHONE (OUTPATIENT)
Dept: INTERNAL MEDICINE CLINIC | Facility: CLINIC | Age: 24
End: 2024-06-11

## 2024-06-11 ENCOUNTER — PATIENT MESSAGE (OUTPATIENT)
Dept: RHEUMATOLOGY | Facility: CLINIC | Age: 24
End: 2024-06-11

## 2024-06-11 PROBLEM — IMO0002 LUPUS: Status: ACTIVE | Noted: 2024-06-11

## 2024-06-11 PROBLEM — M32.9 LUPUS (HCC): Status: ACTIVE | Noted: 2024-06-11

## 2024-06-11 NOTE — TELEPHONE ENCOUNTER
Received a message from the patient's psychiatrist that she is having some fevers and a flareup?  For the past few days.  Can you triage her and find out what is going on.  If it is related to the lupus then she will need to follow-up with her rheumatologist    Holly Flores DO

## 2024-06-11 NOTE — TELEPHONE ENCOUNTER
Called and spoke to patient, she states she thinks it is related to the Lupus. She does not feel sick. Psychiatrist was to reach out to the Rheumatologist, explained to patient that writer unable to see if that was done and recommended patient to reach out to Rheumatologist to f/u. Patient verbalized understanding.

## 2024-06-12 RX ORDER — PREDNISONE 10 MG/1
TABLET ORAL
Qty: 20 TABLET | Refills: 0 | Status: SHIPPED | OUTPATIENT
Start: 2024-06-12

## 2024-06-12 NOTE — TELEPHONE ENCOUNTER
From: Maude Dougherty  To: Viktoriya Waldrop  Sent: 6/11/2024 10:09 PM CDT  Subject: Current flare up & fever     Hi Dr. Waldrop,   I’m currently having a flare up and have had a fever usually ranging from around 101-102 without medicine since Saturday. Also very fatigued - I am taking acetaminophen pretty regularly but since it is autoimmune related I’m not sure if I should just continue to wait it out?

## 2024-06-12 NOTE — TELEPHONE ENCOUNTER
Phoned pt. States since 6/8/24 has been running fever 101 -102. Takes acetaminophen and fever comes down but 8 hrs after taking acetaminophen, fever returns. Joint pain and swelling unchanged since onset of fever. Also has chills and fatigue. Denies sore throat, runny nose or cough. Pt refers to this as a flare up though [facility] has never had this kind in the past.

## 2024-07-01 ENCOUNTER — TELEPHONE (OUTPATIENT)
Dept: RHEUMATOLOGY | Facility: CLINIC | Age: 24
End: 2024-07-01

## 2024-07-01 DIAGNOSIS — I73.00 RAYNAUD'S DISEASE WITHOUT GANGRENE: ICD-10-CM

## 2024-07-01 DIAGNOSIS — R76.8 POSITIVE ANA (ANTINUCLEAR ANTIBODY): Primary | ICD-10-CM

## 2024-07-01 NOTE — TELEPHONE ENCOUNTER
Wrong pool     Patient went to go get labs done but the order was not there. Patient is requesting orders. Appointment is July 3rd with .

## 2024-07-01 NOTE — TELEPHONE ENCOUNTER
Patient went to go get labs done but the order was not there. Patient is requesting orders. Appointment is July 3rd with .

## 2024-07-03 ENCOUNTER — LAB ENCOUNTER (OUTPATIENT)
Dept: LAB | Facility: HOSPITAL | Age: 24
End: 2024-07-03
Attending: INTERNAL MEDICINE
Payer: COMMERCIAL

## 2024-07-03 ENCOUNTER — OFFICE VISIT (OUTPATIENT)
Dept: RHEUMATOLOGY | Facility: CLINIC | Age: 24
End: 2024-07-03
Payer: COMMERCIAL

## 2024-07-03 VITALS
SYSTOLIC BLOOD PRESSURE: 112 MMHG | WEIGHT: 107 LBS | DIASTOLIC BLOOD PRESSURE: 76 MMHG | HEIGHT: 64.75 IN | HEART RATE: 112 BPM | BODY MASS INDEX: 18.04 KG/M2

## 2024-07-03 DIAGNOSIS — Z51.81 MEDICATION MONITORING ENCOUNTER: ICD-10-CM

## 2024-07-03 DIAGNOSIS — R76.8 POSITIVE ANA (ANTINUCLEAR ANTIBODY): ICD-10-CM

## 2024-07-03 DIAGNOSIS — I73.00 RAYNAUD'S DISEASE WITHOUT GANGRENE: ICD-10-CM

## 2024-07-03 DIAGNOSIS — M32.9 SYSTEMIC LUPUS ERYTHEMATOSUS, UNSPECIFIED SLE TYPE, UNSPECIFIED ORGAN INVOLVEMENT STATUS (HCC): Primary | ICD-10-CM

## 2024-07-03 DIAGNOSIS — T69.1XXA CHILBLAINS, INITIAL ENCOUNTER: ICD-10-CM

## 2024-07-03 LAB
ALBUMIN SERPL-MCNC: 4.4 G/DL (ref 3.2–4.8)
ALBUMIN/GLOB SERPL: 1.3 {RATIO} (ref 1–2)
ALP LIVER SERPL-CCNC: 58 U/L
ALT SERPL-CCNC: 10 U/L
ANION GAP SERPL CALC-SCNC: 4 MMOL/L (ref 0–18)
AST SERPL-CCNC: 19 U/L (ref ?–34)
BASOPHILS # BLD AUTO: 0.04 X10(3) UL (ref 0–0.2)
BASOPHILS NFR BLD AUTO: 0.8 %
BILIRUB SERPL-MCNC: 0.5 MG/DL (ref 0.3–1.2)
BILIRUB UR QL: NEGATIVE
BUN BLD-MCNC: 13 MG/DL (ref 9–23)
BUN/CREAT SERPL: 14.9 (ref 10–20)
C3 SERPL-MCNC: 101.8 MG/DL (ref 82–160)
C4 SERPL-MCNC: 20.6 MG/DL (ref 12–36)
CALCIUM BLD-MCNC: 10.2 MG/DL (ref 8.7–10.4)
CHLORIDE SERPL-SCNC: 107 MMOL/L (ref 98–112)
CLARITY UR: CLEAR
CO2 SERPL-SCNC: 28 MMOL/L (ref 21–32)
CREAT BLD-MCNC: 0.87 MG/DL
CRP SERPL-MCNC: <0.4 MG/DL (ref ?–1)
DEPRECATED RDW RBC AUTO: 42.9 FL (ref 35.1–46.3)
EGFRCR SERPLBLD CKD-EPI 2021: 97 ML/MIN/1.73M2 (ref 60–?)
EOSINOPHIL # BLD AUTO: 0.08 X10(3) UL (ref 0–0.7)
EOSINOPHIL NFR BLD AUTO: 1.6 %
ERYTHROCYTE [DISTWIDTH] IN BLOOD BY AUTOMATED COUNT: 13.9 % (ref 11–15)
ERYTHROCYTE [SEDIMENTATION RATE] IN BLOOD: 34 MM/HR
FASTING STATUS PATIENT QL REPORTED: NO
GLOBULIN PLAS-MCNC: 3.3 G/DL (ref 2–3.5)
GLUCOSE BLD-MCNC: 74 MG/DL (ref 70–99)
GLUCOSE UR-MCNC: NORMAL MG/DL
HCT VFR BLD AUTO: 39 %
HGB BLD-MCNC: 13 G/DL
HGB UR QL STRIP.AUTO: NEGATIVE
IMM GRANULOCYTES # BLD AUTO: 0.01 X10(3) UL (ref 0–1)
IMM GRANULOCYTES NFR BLD: 0.2 %
KETONES UR-MCNC: NEGATIVE MG/DL
LEUKOCYTE ESTERASE UR QL STRIP.AUTO: NEGATIVE
LYMPHOCYTES # BLD AUTO: 2.31 X10(3) UL (ref 1–4)
LYMPHOCYTES NFR BLD AUTO: 45.4 %
MCH RBC QN AUTO: 28.6 PG (ref 26–34)
MCHC RBC AUTO-ENTMCNC: 33.3 G/DL (ref 31–37)
MCV RBC AUTO: 85.9 FL
MONOCYTES # BLD AUTO: 0.7 X10(3) UL (ref 0.1–1)
MONOCYTES NFR BLD AUTO: 13.8 %
NEUTROPHILS # BLD AUTO: 1.95 X10 (3) UL (ref 1.5–7.7)
NEUTROPHILS # BLD AUTO: 1.95 X10(3) UL (ref 1.5–7.7)
NEUTROPHILS NFR BLD AUTO: 38.2 %
NITRITE UR QL STRIP.AUTO: NEGATIVE
OSMOLALITY SERPL CALC.SUM OF ELEC: 287 MOSM/KG (ref 275–295)
PH UR: 7 [PH] (ref 5–8)
PLATELET # BLD AUTO: 217 10(3)UL (ref 150–450)
POTASSIUM SERPL-SCNC: 5 MMOL/L (ref 3.5–5.1)
PROT SERPL-MCNC: 7.7 G/DL (ref 5.7–8.2)
PROT UR-MCNC: NEGATIVE MG/DL
RBC # BLD AUTO: 4.54 X10(6)UL
SODIUM SERPL-SCNC: 139 MMOL/L (ref 136–145)
SP GR UR STRIP: 1.01 (ref 1–1.03)
UROBILINOGEN UR STRIP-ACNC: NORMAL
WBC # BLD AUTO: 5.1 X10(3) UL (ref 4–11)

## 2024-07-03 PROCEDURE — 36415 COLL VENOUS BLD VENIPUNCTURE: CPT

## 2024-07-03 PROCEDURE — 3078F DIAST BP <80 MM HG: CPT | Performed by: INTERNAL MEDICINE

## 2024-07-03 PROCEDURE — 86225 DNA ANTIBODY NATIVE: CPT

## 2024-07-03 PROCEDURE — 85025 COMPLETE CBC W/AUTO DIFF WBC: CPT

## 2024-07-03 PROCEDURE — 80053 COMPREHEN METABOLIC PANEL: CPT

## 2024-07-03 PROCEDURE — 3008F BODY MASS INDEX DOCD: CPT | Performed by: INTERNAL MEDICINE

## 2024-07-03 PROCEDURE — G2211 COMPLEX E/M VISIT ADD ON: HCPCS | Performed by: INTERNAL MEDICINE

## 2024-07-03 PROCEDURE — 86140 C-REACTIVE PROTEIN: CPT

## 2024-07-03 PROCEDURE — 86160 COMPLEMENT ANTIGEN: CPT

## 2024-07-03 PROCEDURE — 81003 URINALYSIS AUTO W/O SCOPE: CPT

## 2024-07-03 PROCEDURE — 3074F SYST BP LT 130 MM HG: CPT | Performed by: INTERNAL MEDICINE

## 2024-07-03 PROCEDURE — 85652 RBC SED RATE AUTOMATED: CPT

## 2024-07-03 PROCEDURE — 99214 OFFICE O/P EST MOD 30 MIN: CPT | Performed by: INTERNAL MEDICINE

## 2024-07-03 RX ORDER — HYDROXYCHLOROQUINE SULFATE 200 MG/1
200 TABLET, FILM COATED ORAL DAILY
Qty: 90 TABLET | Refills: 1 | Status: SHIPPED | OUTPATIENT
Start: 2024-07-03

## 2024-07-03 RX ORDER — PREDNISONE 10 MG/1
TABLET ORAL
Qty: 20 TABLET | Refills: 0 | Status: SHIPPED | OUTPATIENT
Start: 2024-07-03

## 2024-07-03 NOTE — PATIENT INSTRUCTIONS
You were seen today for Lupus  You still have some joint pain  For now continue hydroxychloroquine  Could consider adding more medications like azathioprine if you continue to have flares  I gave you a prednisone taper in case to flare again  Blood work today  Follow-up in 3 to 4 months        [facility]  [address] [place], [place] [address]  Phone: [phone]

## 2024-07-03 NOTE — PROGRESS NOTES
Maude Dougherty is a 24 year old female.    HPI:     Chief Complaint   Patient presents with    Follow - Up     Positive STEPHANIE       I had the pleasure of seeing Maude Dougherty on 7/3/2024 for follow up +STEPHANIE, chillblains, RP, SLE (+STEPHANIE 1:320, +dsDNA 40, low C3)    Current Medications:  Meloxicam as needed    mg daily- started 2/2024  Blood work:  +STEPHANIE 1:320 speckled pattern, +dsDNA 40  UA no blood or protein   +cryoglobulin, cryocrit 0.3%, +IgG, IgA, IgM, kappa and C3  Neg RF, CCP, APL panel, SPEP, ANCA, myositis panel  Neg cryofibrinigon, cold hemaglutins, monoclonal protein    Interval History:  This is a 22 yo F with no known medical history referred for a +STEPHANIE.  She was following with dermatology due to rash on her hands that developed around November.  It was mostly in her right hand on the palmar aspect of there fingers.  She had a biopsy done that was consistent with perniosis. She reports having some pain with the lesions mostly in the morning. Did not notice if cold weather worsened it.   She also developed Raynaud's symptoms about 1 month ago.  It is induced by the cold.  About a year ago in December 2020 she also had erythema on her nose and her cheeks.  It lasted a few weeks.  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  She currently is on valacyclovir twice a day.  This happened in November.  Denies any or symptoms of sicca, hair loss, lymphadenopathy, fever chills, serositis, DVT or PE, miscarriages, chest pain or shortness of breath.  No family history of autoimmune diseases.  She was found to have a positive STEPHANIE 1: 80 speckled pattern and + cryoglobulin.  Denies any petechia or purpura lesions.  No history of hepatitis C or HIV.  Denies any joint pain or swelling    2/5/2024:  Presents for f/u of +STEPHANIE, chillblains/perniosis, Raynaunds  Having more joint pain ninfa at night and in the morning. Feels very stiff, at time hard to move the covers  R hand can become swollen  Has a hx  of right wrist tendonitis over 2 years ago and received an injection which helped, this was in May 2022  Has had pain in the shoulders  Knees can hurt in the morning but not severe  No psoriasis but does have a dry scalp  No sores in the mouth, hair loss. Some dry eyes  Taking meloxicam as needed, helps slightly   Recent blood work showed elevated ESR 38    7/2/2024:  Presents for f/u of SLE (+STEPHANIE, chillblains/perniosis, Raynaunds, dsDNA, Low C3)  Now on  mg daily  Still having some joint pain in the hands and shoulders. Hands can swell. At times hard to make a fist or extend the fingers   In June was having fevers, fatigue, chills, worsening joint pain and swelling and was given prednisone taper and felt better  No rashes, no sores in the mouth  Has been trying to change her diet, now gluten free which has been helping         HISTORY:  No past medical history on file.   Social Hx Reviewed   Family Hx Reviewed     Medications (Active prior to today's visit):  Current Outpatient Medications   Medication Sig Dispense Refill    hydroxychloroquine 200 MG Oral Tab Take 1 tablet (200 mg total) by mouth daily. 90 tablet 0    Meloxicam 15 MG Oral Tab Take 1 tablet (15 mg total) by mouth daily as needed for Pain. Do NOT take with other NSAIDs 30 tablet 0    [START ON 11/18/2024] amphetamine-dextroamphetamine (ADDERALL) 10 MG Oral Tab Take 1 tablet (10 mg total) by mouth 2 (two) times daily. 60 tablet 0    traZODone 50 MG Oral Tab Take 0.5-1 tablets (25-50 mg total) by mouth nightly as needed for Sleep. 30 tablet 2    Multiple Vitamins-Minerals (WOMENS MULTIVITAMIN) Oral Tab Take 1 tablet by mouth daily.      Omega-3 1000 MG Oral Cap Take 1 capsule by mouth daily.      predniSONE 10 MG Oral Tab 30 mg daily x3 days then 20 mg daily x3 days then 10 mg daily x3 days then stop (Patient not taking: Reported on 7/3/2024) 20 tablet 0    amphetamine-dextroamphetamine 10 MG Oral Tab Take 1 tablet (10 mg total) by mouth 2 (two)  times daily. (Patient not taking: Reported on 7/3/2024) 60 tablet 0    predniSONE 10 MG Oral Tab 30 mg daily x3 days then 20 mg daily x3 days then 10 mg daily x3 days then stop (Patient not taking: Reported on 7/3/2024) 20 tablet 0    MAGNESIUM OR Take 1 tablet by mouth daily. (Patient not taking: Reported on 7/3/2024)       .cmed  Allergies:  No Known Allergies      ROS:   All other ROS are negative.     PHYSICAL EXAM:   GEN: AAOx3, NAD  HEENT: EOMI, PERRLA, no injection or icterus, oral mucosa moist, no oral lesions. No lymphadenopathy. No facial rash  CVS: RRR, no murmurs rubs or gallops. Equal 2+ distal pulses.   LUNGS: CTAB, no increased work of breathing  ABDOMEN:  soft NT/ND, +BS, no HSM  SKIN: No rashes or skin lesions. No nail findings  MSK:  R hand palmar aspect small erythematous lesions, nontender    NEURO: Cranial nerves II-XII intact grossly. 5/5 strength throughout in both upper and lower extremities, sensation intact.  PSYCH: normal mood       LABS:     Component      Latest Ref Rng 2/2/2022   MYELOPEROX ANTIBODIES, IGG      0 - 19 AU/mL 15    SERINE PROTEASE3, IGG      0 - 19 AU/mL 1    ANCA IFA Titer      <1:20  <1:20    ANCA IFA Pattern      None Detected  None Detected    COMPLEMENT C4      10.0 - 40.0 mg/dL 15.0    COMPLEMENT C3      90.0 - 180.0 mg/dL 91.9    Anti Double Strand DNA      <10  <10    Anti-Rogers/RNP Antibody      Negative  Negative    Anti-Smith Antibody      Negative  Negative    Anti-Sjogren's A      Negative  Negative    Anti-Sjogren's B      Negative  Negative    Scleroderma (Scl-70) (LEWIS) Antibody, IgG      0 - 40 AU/mL 0    CK      26 - 192 U/L 208 (H)       Component      Latest Ref Rng 10/12/2023   RHEUMATOID FACTOR      <15 IU/mL <10    C-Citrullinated Peptide IgG AB      0.0 - 6.9 U/mL 3.8    SED RATE      0 - 20 mm/Hr 38 (H)    C-REACTIVE PROTEIN      <0.30 mg/dL <0.29           Imaging:     XR R wrist  CONCLUSION:  Negative     ASSESSMENT/PLAN:     SLE (+STEPHANIE, dsDNA,  inflammatory arthritis, Raynaud's, chilblains/perniosis)  - She continues to have intermittent joint pain.  At times it is hard to extend her fingers.  - She had a recent flare with fevers, fatigue, joint pain and was given prednisone, symptoms improved significantly  - She is on hydroxychloroquine 200 mg daily, she is tolerating it  - Advised that we can add azathioprine, she wants to hold off on adding any other medications right now  - Blood work today  - Patient provided with [facility] information to evaluate for Plaquenil toxicity  - Also gave patient a prednisone taper in case she flares    Pt will f/u in 3-4 mos     There is a longitudinal care relationship with me, the care plan reflects the ongoing nature of the continuous relationship of care, and the medical record indicates that there is ongoing treatment of a serious/complex medical condition which I am currently managing.  is Applicable.     Viktoriya Waldrop MD  7/2/2024   8:45 AM

## 2024-07-05 LAB — DSDNA AB TITR SER: 80 {TITER}

## 2024-11-13 ENCOUNTER — TELEPHONE (OUTPATIENT)
Dept: RHEUMATOLOGY | Facility: CLINIC | Age: 24
End: 2024-11-13

## 2024-12-12 ENCOUNTER — OFFICE VISIT (OUTPATIENT)
Dept: INTERNAL MEDICINE CLINIC | Facility: CLINIC | Age: 24
End: 2024-12-12
Payer: COMMERCIAL

## 2024-12-12 VITALS
WEIGHT: 111.19 LBS | HEIGHT: 64.75 IN | DIASTOLIC BLOOD PRESSURE: 64 MMHG | BODY MASS INDEX: 18.75 KG/M2 | HEART RATE: 80 BPM | SYSTOLIC BLOOD PRESSURE: 110 MMHG | TEMPERATURE: 98 F | OXYGEN SATURATION: 98 %

## 2024-12-12 DIAGNOSIS — F41.8 PERFORMANCE ANXIETY: ICD-10-CM

## 2024-12-12 DIAGNOSIS — Z71.6 ENCOUNTER FOR TOBACCO USE CESSATION COUNSELING: ICD-10-CM

## 2024-12-12 DIAGNOSIS — M32.9 SYSTEMIC LUPUS ERYTHEMATOSUS, UNSPECIFIED SLE TYPE, UNSPECIFIED ORGAN INVOLVEMENT STATUS (HCC): ICD-10-CM

## 2024-12-12 DIAGNOSIS — Z00.00 ROUTINE PHYSICAL EXAMINATION: Primary | ICD-10-CM

## 2024-12-12 DIAGNOSIS — L30.9 HAND DERMATITIS: ICD-10-CM

## 2024-12-12 DIAGNOSIS — Z12.4 SCREENING FOR CERVICAL CANCER: ICD-10-CM

## 2024-12-12 DIAGNOSIS — F90.9 ATTENTION DEFICIT HYPERACTIVITY DISORDER (ADHD), UNSPECIFIED ADHD TYPE: ICD-10-CM

## 2024-12-12 PROBLEM — I73.00 RAYNAUD'S PHENOMENON WITHOUT GANGRENE: Status: RESOLVED | Noted: 2023-12-12 | Resolved: 2024-12-12

## 2024-12-12 PROBLEM — Z87.891 HISTORY OF TOBACCO USE: Status: ACTIVE | Noted: 2024-12-12

## 2024-12-12 PROCEDURE — 88175 CYTOPATH C/V AUTO FLUID REDO: CPT | Performed by: INTERNAL MEDICINE

## 2024-12-12 RX ORDER — TRIAMCINOLONE ACETONIDE 1 MG/G
CREAM TOPICAL
Qty: 60 G | Refills: 0 | Status: SHIPPED | OUTPATIENT
Start: 2024-12-12

## 2024-12-12 NOTE — PROGRESS NOTES
Patient Office Visit    ASSESSMENT AND PLAN:   1. Routine physical examination  Note:  Please take 2000 IU of vitamin D daily for life to keep your bones strong. Please see your dentist every 6 months. Continue with regular eye exams.     2. Systemic lupus erythematosus, unspecified SLE type, unspecified organ involvement status (HCC)  Note: continue follow up with rheumatology. Currently on plaquenil     3. Hand dermatitis  Note: recommended cream below in addition to cera ve or cetaphil   - triamcinolone 0.1 % External Cream; Apply twice a day for 2 weeks then take a break for 2 weeks then apply again as needed to affected area  Dispense: 60 g; Refill: 0    4. Screening for cervical cancer  - ThinPrep PAP with HPV Reflex Request B; Future    5. Encounter for tobacco use cessation counseling  Note: no longer smoking     6. Attention deficit hyperactivity disorder (ADHD), unspecified ADHD type  Note: doing well off medication     7. Performance anxiety  Note: takes propanolol as needed     RTC in 1 year for a physical       Patient/Caregiver Education: Patient/Caregiver Education: There are no barriers to learning. Medical education done. Outcome: Patient verbalizes understanding. Patient is notified to call with any questions, complications, allergies, or worsening or changing symptoms.  Patient is to call with any side effects or complications from the treatments as a result of today.      Reviewed Past Medical History and   Patient Active Problem List   Diagnosis    Attention deficit hyperactivity disorder (ADHD)    LGSIL on Pap smear of cervix    Systemic lupus erythematosus (HCC)    History of tobacco use       No orders of the defined types were placed in this encounter.    Requested Prescriptions     Signed Prescriptions Disp Refills    triamcinolone 0.1 % External Cream 60 g 0     Sig: Apply twice a day for 2 weeks then take a break for 2 weeks then apply again as needed to affected area         Holly  Roslyn Flores DO  CC:  Chief Complaint   Patient presents with    Physical         HPI:   Maude Dougherty is a 24 year old female who presents for a physical    Lupus: was recently diagnosed with lupus and she feels much better after being on plaquenil. Raynaud's is still present  ADHD: taking a short break from medication  Dry hands: gets in frequently and does wash her hands a lot     History reviewed. No pertinent past medical history.    History reviewed. No pertinent surgical history.    Social History:  Social History     Socioeconomic History    Marital status: Single   Tobacco Use    Smoking status: Former     Current packs/day: 0.00     Types: Cigarettes     Quit date: 5/30/2021     Years since quitting: 3.5    Smokeless tobacco: Current   Vaping Use    Vaping status: Former   Substance and Sexual Activity    Alcohol use: Yes     Comment: occ    Drug use: Never   Other Topics Concern    Caffeine Concern Yes     Comment: 100 mg daily    Exercise Yes     Comment: 4x/week     Family History:  Family History   Problem Relation Age of Onset    No Known Problems Mother     No Known Problems Father      Allergies:  Allergies[1]  Current Meds:  Medications Ordered Prior to Encounter[2]      REVIEW OF SYSTEMS   Constitutional: no fatigue normal energy no weight changes   HENT: normal sinuses and no mouth issues   Eyes: . normal vision no eye pain   Respiratory: normal respirations no cough   Cardiovascular: no CP, or palpitations   Gastrointestinal: normal bowels and no abd pains   Genitourinary:  normal urination no hematuria, no frequency   Musculoskeletal: no pains in arms/legs, normal range of motion   Skin: dry skin   Neurological:  no weakness, no numbness, normal gait   Hematological:  no bruises or bleeding   Psychiatric/Behavioral: normal mood no anxiety normal behavior     /64 (BP Location: Right arm, Patient Position: Sitting, Cuff Size: adult)   Pulse 80   Temp 98.1 °F (36.7 °C) (Temporal)   Ht  5' 4.75\" (1.645 m)   Wt 111 lb 3.2 oz (50.4 kg)   LMP 12/02/2024 (Exact Date)   SpO2 98%   BMI 18.65 kg/m²     PHYSICAL EXAM:   Constitutional: Vital signs reviewed as noted, well developed, in no acute distress.   HENT: NCAT, bilateral ear canal and tympanic membrane appear normal  Eyes: pupils reactive bilaterally  Neck: No thyroidmegaly  Cardiovascular: nl s1 s2 no m/r/g  Pulmonary/Chest: CTA bilaterally with no wheezes  Abdominal: Soft NT normal Bowel sounds  Extremities: no pedal edema   Neurological:  no weakness in UE and LE, reflexes are normal  Skin: dry skin and raynaud's noted on the hands bilaterally    Psychiatric:normal mood                [1] No Known Allergies  [2]   Current Outpatient Medications on File Prior to Visit   Medication Sig Dispense Refill    propranolol 10 MG Oral Tab Take 1 tablet (10 mg total) by mouth daily as needed (presentation or interview anxiety). 30 tablet 1    hydroxychloroquine 200 MG Oral Tab Take 1 tablet (200 mg total) by mouth daily. 90 tablet 1    Meloxicam 15 MG Oral Tab Take 1 tablet (15 mg total) by mouth daily as needed for Pain. Do NOT take with other NSAIDs 30 tablet 0    traZODone 50 MG Oral Tab Take 0.5-1 tablets (25-50 mg total) by mouth nightly as needed for Sleep. 30 tablet 2    Multiple Vitamins-Minerals (WOMENS MULTIVITAMIN) Oral Tab Take 1 tablet by mouth daily.      Omega-3 1000 MG Oral Cap Take 1 capsule by mouth daily.      amphetamine-dextroamphetamine 10 MG Oral Tab Take 1 tablet (10 mg total) by mouth 2 (two) times daily. (Patient not taking: Reported on 12/12/2024) 60 tablet 0     No current facility-administered medications on file prior to visit.

## 2024-12-12 NOTE — PATIENT INSTRUCTIONS
Please take 2000 IU of vitamin D daily for life to keep your bones strong    Try the steroid cream I have provided and apply cera ve or cetaphil regularly too    We did your pap today and it may take a few days to weeks for the results to come back    See me yearly for a physical

## 2024-12-23 DIAGNOSIS — R87.612 LGSIL ON PAP SMEAR OF CERVIX: Primary | ICD-10-CM

## 2024-12-23 LAB
.: ABNORMAL
.: ABNORMAL

## 2025-01-30 ENCOUNTER — OFFICE VISIT (OUTPATIENT)
Facility: CLINIC | Age: 25
End: 2025-01-30
Payer: COMMERCIAL

## 2025-01-30 VITALS
WEIGHT: 109 LBS | SYSTOLIC BLOOD PRESSURE: 104 MMHG | HEART RATE: 58 BPM | HEIGHT: 64.75 IN | BODY MASS INDEX: 18.38 KG/M2 | DIASTOLIC BLOOD PRESSURE: 60 MMHG

## 2025-01-30 DIAGNOSIS — Z11.3 SCREEN FOR STD (SEXUALLY TRANSMITTED DISEASE): ICD-10-CM

## 2025-01-30 DIAGNOSIS — Z01.419 WELL WOMAN EXAM WITH ROUTINE GYNECOLOGICAL EXAM: Primary | ICD-10-CM

## 2025-01-30 DIAGNOSIS — R87.612 LGSIL ON PAP SMEAR OF CERVIX: ICD-10-CM

## 2025-01-30 DIAGNOSIS — N87.0 MILD DYSPLASIA OF CERVIX: ICD-10-CM

## 2025-01-30 DIAGNOSIS — Z12.4 CERVICAL CANCER SCREENING: ICD-10-CM

## 2025-01-30 DIAGNOSIS — D84.9 IMMUNOSUPPRESSION (HCC): ICD-10-CM

## 2025-01-30 DIAGNOSIS — Z01.812 PRE-PROCEDURAL LABORATORY EXAMINATION: ICD-10-CM

## 2025-01-30 LAB
CONTROL LINE PRESENT WITH A CLEAR BACKGROUND (YES/NO): YES YES/NO
KIT LOT #: NORMAL NUMERIC
PREGNANCY TEST, URINE: NEGATIVE

## 2025-01-30 PROCEDURE — 87491 CHLMYD TRACH DNA AMP PROBE: CPT | Performed by: STUDENT IN AN ORGANIZED HEALTH CARE EDUCATION/TRAINING PROGRAM

## 2025-01-30 PROCEDURE — 3074F SYST BP LT 130 MM HG: CPT | Performed by: STUDENT IN AN ORGANIZED HEALTH CARE EDUCATION/TRAINING PROGRAM

## 2025-01-30 PROCEDURE — 81025 URINE PREGNANCY TEST: CPT | Performed by: STUDENT IN AN ORGANIZED HEALTH CARE EDUCATION/TRAINING PROGRAM

## 2025-01-30 PROCEDURE — 3078F DIAST BP <80 MM HG: CPT | Performed by: STUDENT IN AN ORGANIZED HEALTH CARE EDUCATION/TRAINING PROGRAM

## 2025-01-30 PROCEDURE — 57454 BX/CURETT OF CERVIX W/SCOPE: CPT | Performed by: STUDENT IN AN ORGANIZED HEALTH CARE EDUCATION/TRAINING PROGRAM

## 2025-01-30 PROCEDURE — 3008F BODY MASS INDEX DOCD: CPT | Performed by: STUDENT IN AN ORGANIZED HEALTH CARE EDUCATION/TRAINING PROGRAM

## 2025-01-30 PROCEDURE — 99385 PREV VISIT NEW AGE 18-39: CPT | Performed by: STUDENT IN AN ORGANIZED HEALTH CARE EDUCATION/TRAINING PROGRAM

## 2025-01-30 PROCEDURE — 99459 PELVIC EXAMINATION: CPT | Performed by: STUDENT IN AN ORGANIZED HEALTH CARE EDUCATION/TRAINING PROGRAM

## 2025-01-30 PROCEDURE — 87591 N.GONORRHOEAE DNA AMP PROB: CPT | Performed by: STUDENT IN AN ORGANIZED HEALTH CARE EDUCATION/TRAINING PROGRAM

## 2025-01-30 NOTE — PROGRESS NOTES
[facility]  Obstetrics and Gynecology   History & Physical      Chief complaint:   Chief Complaint   Patient presents with    New Patient     Establish care     Pap     Abnormal paps for 2yrs in a row now, referred by PCP, last pap smear was 24, LSIL, HPV was NOT tested during either pap smears        Subjective:     HPI: Maude Dougherty is a 24 year old  presenting for mild cervical dysplasia in the setting of immunosuppression.    Her PCP is Holly Flores DO.    Has SLE and is currently on plaquenil.  Has had to taken steroids in the past.  Received gardasil vaccine x2  Had LSIL in  and .  No HPV testing done.        Menstrual history:  Regular menses, no issues    Sexual history:  - Sexually active? Yes - new partner from her last pap smear.  Unsure if he has been HPV vaccinated  - Sexual satisfaction: pleasurable, not painful    Birth control history:  - Current BC: none  - Past BC: nexplanon in the past - did not like    Gynecologic Hygiene:  - history of recurrent bacteria vaginosis, yeast, UTIs: no    Cancer Screening:  Family history of gynecologic cancers (including breast):  - maternal: no  - paternal: no    OB History  OB History    Para Term  AB Living   0 0 0 0 0 0   SAB IAB Ectopic Multiple Live Births   0 0 0 0 0     OB History    Para Term  AB Living   0 0 0 0 0 0   SAB IAB Ectopic Multiple Live Births   0 0 0 0 0       ROS negative unless otherwise stated above    Meds:  Medications Ordered Prior to Encounter[1]    PMH:  Past Medical History:    Dysmenorrhea    Sexually transmitted disease    Chlamydia  + hsv       All:  Allergies[2]    PSH:  History reviewed. No pertinent surgical history.    Social History:  Social History     Socioeconomic History    Marital status: Single   Tobacco Use    Smoking status: Former     Current packs/day: 0.00     Types: Cigarettes     Quit date: 2021     Years since quitting: 3.6    Smokeless  tobacco: Current   Vaping Use    Vaping status: Former   Substance and Sexual Activity    Alcohol use: Yes     Alcohol/week: 2.0 - 3.0 standard drinks of alcohol     Types: 2 - 3 Standard drinks or equivalent per week     Comment: occ    Drug use: Never   Other Topics Concern    Caffeine Concern No    Exercise Yes    Seat Belt Yes    Special Diet Yes     Comment: Gluten free    Stress Concern No    Weight Concern No        Family History:  Family History   Problem Relation Age of Onset    No Known Problems Mother     No Known Problems Father     Heart Disorder Maternal Grandfather     Cancer Maternal Grandmother         Breast cancer       Immunization History:  Immunization History   Administered Date(s) Administered    Covid-19 Vaccine Pfizer 30 mcg/0.3 ml 04/01/2021, 04/29/2021    DTAP 07/08/2002, 09/26/2002, 11/18/2002, 08/28/2003, 08/07/2007    FLUZONE 6 months and older PFS 0.5 ml (67582) 09/24/2016, 10/01/2017, 09/16/2021, 10/12/2023    Flucelvax Influenza vaccine, trivalent (ccIIV3), 0.5mL IM 10/24/2022    HEP A,Ped/Adol,(2 Dose) 06/27/2008, 07/31/2010    HEP B, Ped/Adol 07/15/2002, 11/18/2002, 08/28/2003    HEP B/HIB 08/28/2003    HPV (Gardasil) 06/07/2013, 04/18/2014    Hib, Unspecified Formulation 07/15/2002, 09/26/2002, 11/18/2002    IPV 07/08/2002, 09/26/2002, 12/27/2003, 08/07/2007    Influenza 09/28/2012, 09/13/2015, 09/30/2018, 10/21/2019    Influenza Vaccine, trivalent (IIV3), PF 0.5mL (17518) 10/14/2024    MMR 05/16/2003    MMR/Varicella Combined 08/07/2007    Meningococcal-Menveo 2month-55yr 06/07/2013    Pneumococcal (Prevnar 7) 07/08/2002, 11/18/2002, 02/13/2003, 12/27/2003    TDAP 06/07/2013, 12/12/2023    Varicella 05/16/2003         Objective:     Vitals:    01/30/25 0905   BP: 104/60   Pulse: 58   Weight: 109 lb (49.4 kg)   Height: 64.75\"       Body mass index is 18.28 kg/m².    Physical Exam:     General: normal appearance  HEENT: normocephalic, no male pattern baldness, no acne  Breast:  normal contour  Respiratory: normal work of breathing, no extra use of accessory muscles  Cardiac: normal rate  Abdominal: Nontender   MSK: normal range of motion  Neuro: normal movement, normal sensory  Skin: no abnormalities seen    Pelvic:  Speculum Exam without acetic acid:  - normal appearing vulva, perineum, anus  - normal appearing urethral meatus, urethra  - Stage V renny pubic hair development  - normal appearing vagina, well estrogenized with ruggae, physiologic discharge  -  cervix without masses     Colpo  Speculum placed  Acetic acid washed over cervix and vagina  Colposcopy performed  Aceto white noted at 1 and 4 o clock  Biopsies taken from both sites  ECC done  Hemostasis with silver nitrate  Speculum removed         Labs:  Lab Results   Component Value Date    WBC 5.1 2024    RBC 4.54 2024    HGB 13.0 2024    HCT 39.0 2024    MCV 85.9 2024    MCH 28.6 2024    MCHC 33.3 2024    RDW 13.9 2024    .0 2024        Lab Results   Component Value Date    GLU 74 2024    BUN 13 2024    BUNCREA 14.9 2024    CREATSERUM 0.87 2024    ANIONGAP 4 2024    GFRNAA 116 2022    GFRAA 134 2022    CA 10.2 2024    OSMOCALC 287 2024    ALKPHO 58 2024    AST 19 2024    ALT 10 2024    BILT 0.5 2024    TP 7.7 2024    ALB 4.4 2024    GLOBULIN 3.3 2024     2024    K 5.0 2024     2024    CO2 28.0 2024       Lab Results   Component Value Date    CHOLEST 163 05/15/2021    TRIG 64 05/15/2021    HDL 69 (H) 05/15/2021    LDL 81 05/15/2021    VLDL 13 05/15/2021    NONHDLC 94 05/15/2021        Lab Results   Component Value Date    TSH 0.890 05/15/2021        Lab Results   Component Value Date     05/15/2021    A1C 5.2 % 2022         Imaging:  No results found.     Assessment:     Maude Dougherty is a 24 year old  female  presenting for WWE, colpo.    #WWE  [x] labs w PCP       #Contraception counseling  - declines - discussed copper v mirena IUD    #STD screening  [ ] f/u STD panel    #Cervical Cancer Screening  #Lsil x2 in setting of immunosuppression  [ ] follow up colpo path  [x] gardasil vaccine (available from 9 to 44 yo)    #Lifestyle counseling based on recommendations from the American College of Lifestyle Medicine  1) Nutrition: extensive scientific evidence supports a diet that is predominantly whole-food and plant based as an important strategy in health optimization.  Such a diet is rich in fiber, antioxidants and is nutrient dense (ex. Minimally processed vegetables, fruits, whole grains, legumes, nuts and seeds)  2) Physical activity: at least 150 minutes of moderate exercise per week (anything that gets your heart beating faster).  You could divide this time into 30 minutes per day for 5 days.  2 of these days should be devoted to whole body muscle-strengthening/building activities (weight lifting, for example).  3) Sleep: 7 to 9 hours per night of restorative sleep.  You can use black out curtains, white noise machine and minimize screen time to do this.    4) Continue to avoid or limit risky substances like alcohol, nicotine, vaping, drugs, prescription opioids.  If you need help - through medication or counseling - to do this, please contact me so I can get you a referral or resources to support you.  5) Stress: Continue developing coping strategies to decrease stress.  6) Leverage the power of relationships and social networks to help reinforce health behaviors.  Studies show people are more successful at achieving health goals if the people they live with are supporting and even working towards those same health goals.    Return of care in 1 year or PRN     Nancy Woodall MD   [facility] - OBGYN    Note to patient and family:  The 21st Century Cures Act makes medical notes available to patients in the interest  of transparency.  However, please be advised that this is a medical document.  It is intended as a peer to peer communication.  It is written in medical language and may contain abbreviations or verbiage that are technical and unfamiliar.  It may appear blunt or direct.  Medical documents are intended to carry relevant information, facts as evident, and the clinical opinion of the practitioner.          [1]   Current Outpatient Medications on File Prior to Visit   Medication Sig Dispense Refill    Amphetamine-Dextroamphet ER (ADDERALL XR) 20 MG Oral Capsule SR 24 Hr Take 1 capsule (20 mg total) by mouth daily. 30 capsule 0    traZODone 50 MG Oral Tab Take 0.5-1 tablets (25-50 mg total) by mouth nightly as needed for Sleep. 30 tablet 2    triamcinolone 0.1 % External Cream Apply twice a day for 2 weeks then take a break for 2 weeks then apply again as needed to affected area 60 g 0    propranolol 10 MG Oral Tab Take 1 tablet (10 mg total) by mouth daily as needed (presentation or interview anxiety). 30 tablet 1    hydroxychloroquine 200 MG Oral Tab Take 1 tablet (200 mg total) by mouth daily. 90 tablet 1    Meloxicam 15 MG Oral Tab Take 1 tablet (15 mg total) by mouth daily as needed for Pain. Do NOT take with other NSAIDs 30 tablet 0    Multiple Vitamins-Minerals (WOMENS MULTIVITAMIN) Oral Tab Take 1 tablet by mouth daily.      Omega-3 1000 MG Oral Cap Take 1 capsule by mouth daily.       No current facility-administered medications on file prior to visit.   [2] No Known Allergies

## 2025-02-03 LAB
C TRACH DNA SPEC QL NAA+PROBE: NEGATIVE
N GONORRHOEA DNA SPEC QL NAA+PROBE: NEGATIVE

## 2025-02-27 ENCOUNTER — OFFICE VISIT (OUTPATIENT)
Dept: OBGYN CLINIC | Facility: CLINIC | Age: 25
End: 2025-02-27
Payer: COMMERCIAL

## 2025-02-27 VITALS
HEART RATE: 96 BPM | DIASTOLIC BLOOD PRESSURE: 70 MMHG | SYSTOLIC BLOOD PRESSURE: 114 MMHG | WEIGHT: 109 LBS | HEIGHT: 65 IN | BODY MASS INDEX: 18.16 KG/M2

## 2025-02-27 DIAGNOSIS — N87.9 DYSPLASIA OF CERVIX: ICD-10-CM

## 2025-02-27 DIAGNOSIS — Z01.812 PRE-PROCEDURAL LABORATORY EXAMINATION: Primary | ICD-10-CM

## 2025-02-27 PROCEDURE — 57505 ENDOCERVICAL CURETTAGE: CPT | Performed by: STUDENT IN AN ORGANIZED HEALTH CARE EDUCATION/TRAINING PROGRAM

## 2025-02-27 PROCEDURE — 88342 IMHCHEM/IMCYTCHM 1ST ANTB: CPT | Performed by: STUDENT IN AN ORGANIZED HEALTH CARE EDUCATION/TRAINING PROGRAM

## 2025-02-27 PROCEDURE — 3078F DIAST BP <80 MM HG: CPT | Performed by: STUDENT IN AN ORGANIZED HEALTH CARE EDUCATION/TRAINING PROGRAM

## 2025-02-27 PROCEDURE — 57522 CONIZATION OF CERVIX: CPT | Performed by: STUDENT IN AN ORGANIZED HEALTH CARE EDUCATION/TRAINING PROGRAM

## 2025-02-27 PROCEDURE — 3074F SYST BP LT 130 MM HG: CPT | Performed by: STUDENT IN AN ORGANIZED HEALTH CARE EDUCATION/TRAINING PROGRAM

## 2025-02-27 PROCEDURE — 81025 URINE PREGNANCY TEST: CPT | Performed by: STUDENT IN AN ORGANIZED HEALTH CARE EDUCATION/TRAINING PROGRAM

## 2025-02-27 PROCEDURE — 88305 TISSUE EXAM BY PATHOLOGIST: CPT | Performed by: STUDENT IN AN ORGANIZED HEALTH CARE EDUCATION/TRAINING PROGRAM

## 2025-02-27 PROCEDURE — 88307 TISSUE EXAM BY PATHOLOGIST: CPT | Performed by: STUDENT IN AN ORGANIZED HEALTH CARE EDUCATION/TRAINING PROGRAM

## 2025-02-27 PROCEDURE — 3008F BODY MASS INDEX DOCD: CPT | Performed by: STUDENT IN AN ORGANIZED HEALTH CARE EDUCATION/TRAINING PROGRAM

## 2025-02-27 RX ORDER — LIDOCAINE HYDROCHLORIDE 10 MG/ML
1 INJECTION, SOLUTION INFILTRATION; PERINEURAL ONCE
Status: SHIPPED | OUTPATIENT
Start: 2025-02-27

## 2025-02-27 NOTE — PROCEDURES
Procedure:  1) LEEP  2) Endocervical curettage    Indication:   10/12/2023: LSIL on pap   12/12/2024: LSIL on pap   01/30/2025: HSIL on colpo, scant on ECC    Pre procedure:   Informed consent obtained?: yes  Cervical cancer screening history:  see above  Immunosuppressed?: yes - on hydroxychloroquine for SLE  Smoker?:no  Recent pelvic infection: no  Gardasil status (can get until age 46 yo): received as teenager    Procedure: After neg UPT, patient was placed in lithotomy.  Speculum was inserted in the vagina.  Vaginal walls and cervix were washed with Lugols.  SCJ was visualized.  Intracervical block was performed and anesthesia was determined to be adequate.  LEEP was done. ECC was done.  Monsels were used for hemostasis.  Patient tolerated procedure well.    Findings:   - normal appearing external genitalia  - normal appearing, well estrogenized vaginal walls  - pre-lugols: colpo biopsies well healed, no masses/erosions/ulcerations seen     Post LEEP Instructions:  - Experiencing vaginal pain for a few days is normal  - Use a pad to catch any blood or discharge  - Call office if bleeding heavier than a period, chills, fever or severe abdominal pain  - Pelvic rest (nothing in the vagina - douching, vaginal sex or tampons) x weeks  - Patient will be notified of findings and follow up either via the patient portal or a phone call

## 2025-05-27 ENCOUNTER — OFFICE VISIT (OUTPATIENT)
Age: 25
End: 2025-05-27
Payer: COMMERCIAL

## 2025-05-27 VITALS
HEART RATE: 84 BPM | RESPIRATION RATE: 16 BRPM | SYSTOLIC BLOOD PRESSURE: 94 MMHG | DIASTOLIC BLOOD PRESSURE: 59 MMHG | HEIGHT: 65 IN | BODY MASS INDEX: 17.66 KG/M2 | WEIGHT: 106 LBS

## 2025-05-27 DIAGNOSIS — M32.9 SYSTEMIC LUPUS ERYTHEMATOSUS, UNSPECIFIED SLE TYPE, UNSPECIFIED ORGAN INVOLVEMENT STATUS (HCC): Primary | ICD-10-CM

## 2025-05-27 DIAGNOSIS — Z51.81 MEDICATION MONITORING ENCOUNTER: ICD-10-CM

## 2025-05-27 DIAGNOSIS — I73.00 RAYNAUD'S DISEASE WITHOUT GANGRENE: ICD-10-CM

## 2025-05-27 DIAGNOSIS — T69.1XXA CHILBLAINS, INITIAL ENCOUNTER: ICD-10-CM

## 2025-05-27 PROCEDURE — 3074F SYST BP LT 130 MM HG: CPT | Performed by: INTERNAL MEDICINE

## 2025-05-27 PROCEDURE — 99214 OFFICE O/P EST MOD 30 MIN: CPT | Performed by: INTERNAL MEDICINE

## 2025-05-27 PROCEDURE — G2211 COMPLEX E/M VISIT ADD ON: HCPCS | Performed by: INTERNAL MEDICINE

## 2025-05-27 PROCEDURE — 3078F DIAST BP <80 MM HG: CPT | Performed by: INTERNAL MEDICINE

## 2025-05-27 PROCEDURE — 3008F BODY MASS INDEX DOCD: CPT | Performed by: INTERNAL MEDICINE

## 2025-05-27 RX ORDER — HYDROXYCHLOROQUINE SULFATE 200 MG/1
200 TABLET, FILM COATED ORAL DAILY
Qty: 90 TABLET | Refills: 1 | Status: SHIPPED | OUTPATIENT
Start: 2025-05-27

## 2025-05-27 NOTE — PROGRESS NOTES
Maude Dougherty is a 25 year old female.    HPI:     Chief Complaint   Patient presents with    SLE    Medication Follow-Up    Joint Pain    Muscle Pain       I had the pleasure of seeing Maude Dougherty on 5/27/2025 for follow up +STEPHANIE, chillblains, RP, SLE (+STEPHANIE 1:320, +dsDNA 40, low C3)    Current Medications:  Meloxicam as needed    mg daily- started 2/2024  Blood work:  +STEPHANIE 1:320 speckled pattern, +dsDNA 40  UA no blood or protein   +cryoglobulin, cryocrit 0.3%, +IgG, IgA, IgM, kappa and C3  Neg RF, CCP, APL panel, SPEP, ANCA, myositis panel  Neg cryofibrinigon, cold hemaglutins, monoclonal protein    Interval History:  This is a 22 yo F with no known medical history referred for a +STEPHANIE.  She was following with dermatology due to rash on her hands that developed around November.  It was mostly in her right hand on the palmar aspect of there fingers.  She had a biopsy done that was consistent with perniosis. She reports having some pain with the lesions mostly in the morning. Did not notice if cold weather worsened it.   She also developed Raynaud's symptoms about 1 month ago.  It is induced by the cold.  About a year ago in December 2020 she also had erythema on her nose and her cheeks.  It lasted a few weeks.  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  She currently is on valacyclovir twice a day.  This happened in November.  Denies any or symptoms of sicca, hair loss, lymphadenopathy, fever chills, serositis, DVT or PE, miscarriages, chest pain or shortness of breath.  No family history of autoimmune diseases.  She was found to have a positive STEPHANIE 1: 80 speckled pattern and + cryoglobulin.  Denies any petechia or purpura lesions.  No history of hepatitis C or HIV.  Denies any joint pain or swelling    2/5/2024:  Presents for f/u of +STEPHANIE, chillblains/perniosis, Raynaunds  Having more joint pain ninfa at night and in the morning. Feels very stiff, at time hard to move the covers  R hand  can become swollen  Has a hx of right wrist tendonitis over 2 years ago and received an injection which helped, this was in May 2022  Has had pain in the shoulders  Knees can hurt in the morning but not severe  No psoriasis but does have a dry scalp  No sores in the mouth, hair loss. Some dry eyes  Taking meloxicam as needed, helps slightly   Recent blood work showed elevated ESR 38    7/2/2024:  Presents for f/u of SLE (+STEPHANIE, chillblains/perniosis, Raynaunds, dsDNA, Low C3)  Now on  mg daily  Still having some joint pain in the hands and shoulders. Hands can swell. At times hard to make a fist or extend the fingers   In June was having fevers, fatigue, chills, worsening joint pain and swelling and was given prednisone taper and felt better  No rashes, no sores in the mouth  Has been trying to change her diet, now gluten free which has been helping     5/27/2025:  Presents for f/u of SLE (+STEPHANIE, chillblains/perniosis, Raynaunds, dsDNA, Low C3)  On  mg daily  Over the past month having more pain in hands, hips, knees and feet  Having more stiffness from seating position and in the morning  Has been taking meloxicam for the pain  Having swelling in the hands, elbows, ankles   She also has muscle pain in the arms  No rashes, sores in the mouth, LN, fevers  Also having jaw pain and hard to open mouth at times          HISTORY:  Past Medical History:    Dysmenorrhea    Sexually transmitted disease    Chlamydia 2021 + hsv      Social Hx Reviewed   Family Hx Reviewed     Medications (Active prior to today's visit):  Current Outpatient Medications   Medication Sig Dispense Refill    Amphetamine-Dextroamphet ER (ADDERALL XR) 20 MG Oral Capsule SR 24 Hr Take 1 capsule (20 mg total) by mouth daily. 30 capsule 0    traZODone 50 MG Oral Tab Take 0.5-1 tablets (25-50 mg total) by mouth nightly as needed for Sleep. 30 tablet 2    triamcinolone 0.1 % External Cream Apply twice a day for 2 weeks then take a break for 2  weeks then apply again as needed to affected area 60 g 0    propranolol 10 MG Oral Tab Take 1 tablet (10 mg total) by mouth daily as needed (presentation or interview anxiety). 30 tablet 1    hydroxychloroquine 200 MG Oral Tab Take 1 tablet (200 mg total) by mouth daily. 90 tablet 1    Meloxicam 15 MG Oral Tab Take 1 tablet (15 mg total) by mouth daily as needed for Pain. Do NOT take with other NSAIDs 30 tablet 0    Multiple Vitamins-Minerals (WOMENS MULTIVITAMIN) Oral Tab Take 1 tablet by mouth daily.      Omega-3 1000 MG Oral Cap Take 1 capsule by mouth daily.       .cmed  Allergies:  No Known Allergies      ROS:   All other ROS are negative.     PHYSICAL EXAM:   GEN: AAOx3, NAD  HEENT: EOMI, PERRLA, no injection or icterus, oral mucosa moist, no oral lesions. No lymphadenopathy. No facial rash  CVS: RRR, no murmurs rubs or gallops. Equal 2+ distal pulses.   LUNGS: CTAB, no increased work of breathing  ABDOMEN:  soft NT/ND, +BS, no HSM  SKIN: No rashes or skin lesions. No nail findings  MSK:  R hand palmar aspect small erythematous lesions, nontender    NEURO: Cranial nerves II-XII intact grossly. 5/5 strength throughout in both upper and lower extremities, sensation intact.  PSYCH: normal mood       LABS:     Component      Latest Ref Rng 2/2/2022   MYELOPEROX ANTIBODIES, IGG      0 - 19 AU/mL 15    SERINE PROTEASE3, IGG      0 - 19 AU/mL 1    ANCA IFA Titer      <1:20  <1:20    ANCA IFA Pattern      None Detected  None Detected    COMPLEMENT C4      10.0 - 40.0 mg/dL 15.0    COMPLEMENT C3      90.0 - 180.0 mg/dL 91.9    Anti Double Strand DNA      <10  <10    Anti-Rogers/RNP Antibody      Negative  Negative    Anti-Smith Antibody      Negative  Negative    Anti-Sjogren's A      Negative  Negative    Anti-Sjogren's B      Negative  Negative    Scleroderma (Scl-70) (LEWIS) Antibody, IgG      0 - 40 AU/mL 0    CK      26 - 192 U/L 208 (H)       Component      Latest Ref Rng 10/12/2023   RHEUMATOID FACTOR      <15  IU/mL <10    C-Citrullinated Peptide IgG AB      0.0 - 6.9 U/mL 3.8    SED RATE      0 - 20 mm/Hr 38 (H)    C-REACTIVE PROTEIN      <0.30 mg/dL <0.29           Imaging:     XR R wrist  CONCLUSION:  Negative     ASSESSMENT/PLAN:     SLE (+STEPHANIE, dsDNA, inflammatory arthritis, Raynaud's, chilblains/perniosis)  - She continues to have intermittent joint pain.  At times it is hard to extend her fingers.  - She had a recent flare with fevers, fatigue, joint pain and was given prednisone, symptoms improved significantly  - She is on hydroxychloroquine 200 mg daily, she is tolerating it  - she will take a prednisone taper   - Advised that we can start Benlysta injections, patient provided with information on Benlysta, risks/benefits cussed with patient  - Blood work today  - She was seen by [facility] Nov 2024    Pt will f/u in 3-4 mos     There is a longitudinal care relationship with me, the care plan reflects the ongoing nature of the continuous relationship of care, and the medical record indicates that there is ongoing treatment of a serious/complex medical condition which I am currently managing.  is Applicable.     Viktoriya Waldrop MD  5/27/2025  5:03 PM

## 2025-05-27 NOTE — PATIENT INSTRUCTIONS
You were seen today for lupus  You are having some more joint pain  Continue hydroxychloroquine  I gave you a prednisone taper  Consider Benlysta injections every week, if you agree I can get this medication approved  Blood work today

## 2025-05-30 PROBLEM — T69.1XXA ERYTHEMA PERNIO: Status: ACTIVE | Noted: 2025-05-30

## 2025-05-30 PROBLEM — I73.00 RAYNAUD'S DISEASE WITHOUT GANGRENE: Status: ACTIVE | Noted: 2023-12-12

## 2025-06-17 ENCOUNTER — TELEPHONE (OUTPATIENT)
Dept: RHEUMATOLOGY | Facility: CLINIC | Age: 25
End: 2025-06-17

## 2025-06-17 NOTE — TELEPHONE ENCOUNTER
Per mom of patient she would like to talk to nurses or doctor in regards to where can patient go to refer to go for discuss her medical condition to give a guidance.

## 2025-06-19 NOTE — TELEPHONE ENCOUNTER
Left message to call back. If look for a support group, patient can go to www.[email or web address] for support groups. Call office back with questions.

## 2025-07-05 ENCOUNTER — TELEPHONE (OUTPATIENT)
Age: 25
End: 2025-07-05

## 2025-07-08 ENCOUNTER — TELEPHONE (OUTPATIENT)
Age: 25
End: 2025-07-08

## 2025-07-08 ENCOUNTER — VIRTUAL PHONE E/M (OUTPATIENT)
Age: 25
End: 2025-07-08

## 2025-07-08 DIAGNOSIS — M32.9 SYSTEMIC LUPUS ERYTHEMATOSUS, UNSPECIFIED SLE TYPE, UNSPECIFIED ORGAN INVOLVEMENT STATUS (HCC): Primary | ICD-10-CM

## 2025-07-08 DIAGNOSIS — Z51.81 MEDICATION MONITORING ENCOUNTER: ICD-10-CM

## 2025-07-08 DIAGNOSIS — I73.00 RAYNAUD'S DISEASE WITHOUT GANGRENE: ICD-10-CM

## 2025-07-08 DIAGNOSIS — R06.02 SOB (SHORTNESS OF BREATH): ICD-10-CM

## 2025-07-08 PROCEDURE — 98014 SYNCH AUDIO-ONLY EST MOD 30: CPT | Performed by: INTERNAL MEDICINE

## 2025-07-08 NOTE — PROGRESS NOTES
Maude Dougherty is a 25 year old female.    HPI:     Virtual Telephone Check-In    Maude Dougherty verbally consents to a Virtual/Telephone Check-In visit on 07/08/25.  Patient has been referred to the [facility] website at [email or web address] to review the yearly Consent to Treat document.    Patient understands and accepts financial responsibility for any deductible, co-insurance and/or co-pays associated with this service.    Duration of the service: 30 minutes    Viktoriya Waldrop MD      I had the pleasure of seeing Maude Dougherty on 7/8/2025 for follow up +STEPHANIE, chillblains, RP, SLE (+STEPHANIE 1:320, +dsDNA 40, low C3)    Current Medications:  Meloxicam as needed    mg daily- started 2/2024  Blood work:  +STEPHANIE 1:320 speckled pattern, +dsDNA 40  UA no blood or protein   +cryoglobulin, cryocrit 0.3%, +IgG, IgA, IgM, kappa and C3  Neg RF, CCP, APL panel, SPEP, ANCA, myositis panel  Neg cryofibrinigon, cold hemaglutins, monoclonal protein    Interval History:  This is a 20 yo F with no known medical history referred for a +STEPHANIE.  She was following with dermatology due to rash on her hands that developed around November.  It was mostly in her right hand on the palmar aspect of there fingers.  She had a biopsy done that was consistent with perniosis. She reports having some pain with the lesions mostly in the morning. Did not notice if cold weather worsened it.   She also developed Raynaud's symptoms about 1 month ago.  It is induced by the cold.  About a year ago in December 2020 she also had erythema on her nose and her cheeks.  It lasted a few weeks.  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  She currently is on valacyclovir twice a day.  This happened in November.  Denies any or symptoms of sicca, hair loss, lymphadenopathy, fever chills, serositis, DVT or PE, miscarriages, chest pain or shortness of breath.  No family history of autoimmune diseases.  She was found to have a positive STEPHANIE 1: 80  speckled pattern and + cryoglobulin.  Denies any petechia or purpura lesions.  No history of hepatitis C or HIV.  Denies any joint pain or swelling    2/5/2024:  Presents for f/u of +STEPHANIE, chillblains/perniosis, Raynaunds  Having more joint pain ninfa at night and in the morning. Feels very stiff, at time hard to move the covers  R hand can become swollen  Has a hx of right wrist tendonitis over 2 years ago and received an injection which helped, this was in May 2022  Has had pain in the shoulders  Knees can hurt in the morning but not severe  No psoriasis but does have a dry scalp  No sores in the mouth, hair loss. Some dry eyes  Taking meloxicam as needed, helps slightly   Recent blood work showed elevated ESR 38    7/2/2024:  Presents for f/u of SLE (+STEPHANIE, chillblains/perniosis, Raynaunds, dsDNA, Low C3)  Now on  mg daily  Still having some joint pain in the hands and shoulders. Hands can swell. At times hard to make a fist or extend the fingers   In June was having fevers, fatigue, chills, worsening joint pain and swelling and was given prednisone taper and felt better  No rashes, no sores in the mouth  Has been trying to change her diet, now gluten free which has been helping     5/27/2025:  Presents for f/u of SLE (+STEPHANIE, chillblains/perniosis, Raynaunds, dsDNA, Low C3)  On  mg daily  Over the past month having more pain in hands, hips, knees and feet  Having more stiffness from seating position and in the morning  Has been taking meloxicam for the pain  Having swelling in the hands, elbows, ankles   She also has muscle pain in the arms  No rashes, sores in the mouth, LN, fevers  Also having jaw pain and hard to open mouth at times    7/8/2025:  Telephone visit done today to discuss blood work  Blood work showed normal kidney and liver tests.  Hemoglobin 11.6.  Normal white count and platelets.  Urinalysis with no protein or blood.  dsDNA 224 and low complements.  Continues to have intermittent joint  pain with swelling.  During her last visit she was given prednisone which helped        HISTORY:  Past Medical History:    Dysmenorrhea    Sexually transmitted disease    Chlamydia 2021 + hsv      Social Hx Reviewed   Family Hx Reviewed     Medications (Active prior to today's visit):  Current Outpatient Medications   Medication Sig Dispense Refill    traZODone 50 MG Oral Tab Take 0.5-2 tablets ( mg total) by mouth nightly as needed for Sleep. 60 tablet 0    Amphetamine-Dextroamphet ER (ADDERALL XR) 20 MG Oral Capsule SR 24 Hr Take 1 capsule (20 mg total) by mouth daily. 30 capsule 0    hydroxychloroquine 200 MG Oral Tab Take 1 tablet (200 mg total) by mouth daily. 90 tablet 1    triamcinolone 0.1 % External Cream Apply twice a day for 2 weeks then take a break for 2 weeks then apply again as needed to affected area 60 g 0    propranolol 10 MG Oral Tab Take 1 tablet (10 mg total) by mouth daily as needed (presentation or interview anxiety). 30 tablet 1    Meloxicam 15 MG Oral Tab Take 1 tablet (15 mg total) by mouth daily as needed for Pain. Do NOT take with other NSAIDs 30 tablet 0    Multiple Vitamins-Minerals (WOMENS MULTIVITAMIN) Oral Tab Take 1 tablet by mouth daily.      Omega-3 1000 MG Oral Cap Take 1 capsule by mouth daily.       .cmed  Allergies:  No Known Allergies      ROS:   All other ROS are negative.     PHYSICAL EXAM:   GEN: AAOx3, NAD  HEENT: EOMI, PERRLA, no injection or icterus, oral mucosa moist, no oral lesions. No lymphadenopathy. No facial rash  CVS: RRR, no murmurs rubs or gallops. Equal 2+ distal pulses.   LUNGS: CTAB, no increased work of breathing  ABDOMEN:  soft NT/ND, +BS, no HSM  SKIN: No rashes or skin lesions. No nail findings  MSK:  R hand palmar aspect small erythematous lesions, nontender    NEURO: Cranial nerves II-XII intact grossly. 5/5 strength throughout in both upper and lower extremities, sensation intact.  PSYCH: normal mood       LABS:     Component      Latest Ref  Rng 2/2/2022   MYELOPEROX ANTIBODIES, IGG      0 - 19 AU/mL 15    SERINE PROTEASE3, IGG      0 - 19 AU/mL 1    ANCA IFA Titer      <1:20  <1:20    ANCA IFA Pattern      None Detected  None Detected    COMPLEMENT C4      10.0 - 40.0 mg/dL 15.0    COMPLEMENT C3      90.0 - 180.0 mg/dL 91.9    Anti Double Strand DNA      <10  <10    Anti-Rogers/RNP Antibody      Negative  Negative    Anti-Smith Antibody      Negative  Negative    Anti-Sjogren's A      Negative  Negative    Anti-Sjogren's B      Negative  Negative    Scleroderma (Scl-70) (LEWIS) Antibody, IgG      0 - 40 AU/mL 0    CK      26 - 192 U/L 208 (H)       Component      Latest Ref Rng 10/12/2023   RHEUMATOID FACTOR      <15 IU/mL <10    C-Citrullinated Peptide IgG AB      0.0 - 6.9 U/mL 3.8    SED RATE      0 - 20 mm/Hr 38 (H)    C-REACTIVE PROTEIN      <0.30 mg/dL <0.29           Imaging:     XR R wrist  CONCLUSION:  Negative     ASSESSMENT/PLAN:     SLE (+STEPHANIE, dsDNA, inflammatory arthritis, Raynaud's, chilblains/perniosis)  - She continues to have intermittent joint pain.  At times it is hard to extend her fingers.  - She had a recent flare with fevers, fatigue, joint pain and was given prednisone, symptoms improved significantly  - She is on hydroxychloroquine 200 mg daily, she is tolerating it  - Continues to have flares with joint pain, fatigue  - Discussed starting Benlysta injections.  Discussed risks/benefits which include nausea, abdominal pain, worsening headaches, worsening depression, infusion reaction and PML  - Blood work showed indeterminant TB.  Will obtain chest x-ray  - She was seen by [facility] Nov 2024    Pt will f/u in 3-4 mos     There is a longitudinal care relationship with me, the care plan reflects the ongoing nature of the continuous relationship of care, and the medical record indicates that there is ongoing treatment of a serious/complex medical condition which I am currently managing.  is Applicable.     Viktoriya Waldrop,  MD  7/8/2025  8:20 AM

## 2025-07-09 NOTE — TELEPHONE ENCOUNTER
PA initiated for Benlysta 200mg subcutaneous weekly for lupus via surescripts:     PA Information  Case Id  [id number]  Reference Id  [id number]

## 2025-07-11 ENCOUNTER — HOSPITAL ENCOUNTER (OUTPATIENT)
Dept: GENERAL RADIOLOGY | Age: 25
Discharge: HOME | End: 2025-07-11
Attending: INTERNAL MEDICINE
Payer: COMMERCIAL

## 2025-07-11 DIAGNOSIS — R06.02 SOB (SHORTNESS OF BREATH): ICD-10-CM

## 2025-07-11 PROCEDURE — 71046 X-RAY EXAM CHEST 2 VIEWS: CPT | Performed by: INTERNAL MEDICINE

## 2025-07-12 ENCOUNTER — RESULTS FOLLOW-UP (OUTPATIENT)
Age: 25
End: 2025-07-12

## 2025-07-16 ENCOUNTER — OFFICE VISIT (OUTPATIENT)
Facility: CLINIC | Age: 25
End: 2025-07-16
Payer: COMMERCIAL

## 2025-07-16 VITALS
BODY MASS INDEX: 16.33 KG/M2 | WEIGHT: 98 LBS | DIASTOLIC BLOOD PRESSURE: 56 MMHG | HEIGHT: 65 IN | SYSTOLIC BLOOD PRESSURE: 98 MMHG

## 2025-07-16 DIAGNOSIS — Z86.001 HISTORY OF CARCINOMA IN SITU OF CERVIX: ICD-10-CM

## 2025-07-16 DIAGNOSIS — Z98.890 STATUS POST LEEP (LOOP ELECTROSURGICAL EXCISION PROCEDURE) OF CERVIX: Primary | ICD-10-CM

## 2025-07-16 DIAGNOSIS — R87.612 LGSIL ON PAP SMEAR OF CERVIX: ICD-10-CM

## 2025-07-16 PROCEDURE — 88175 CYTOPATH C/V AUTO FLUID REDO: CPT

## 2025-07-16 PROCEDURE — 99212 OFFICE O/P EST SF 10 MIN: CPT

## 2025-07-16 PROCEDURE — 3074F SYST BP LT 130 MM HG: CPT

## 2025-07-16 PROCEDURE — 3008F BODY MASS INDEX DOCD: CPT

## 2025-07-16 PROCEDURE — 87624 HPV HI-RISK TYP POOLED RSLT: CPT

## 2025-07-16 PROCEDURE — 99459 PELVIC EXAMINATION: CPT

## 2025-07-16 PROCEDURE — 3078F DIAST BP <80 MM HG: CPT

## 2025-07-16 NOTE — PROGRESS NOTES
Maude Dougherty is a 25 year old female  Patient's last menstrual period was 2025 (exact date).   Chief Complaint   Patient presents with    Follow - Up     Repeat pap 24 LSIL    Hx of LSIL in  and , No HPV testing done   2025: Colposcopy, path results HSIL   25 LEEP, ectocervical and endocervical margins positive for HSIL     OBSTETRICS HISTORY:  OB History    Para Term  AB Living   0 0 0 0 0 0   SAB IAB Ectopic Multiple Live Births   0 0 0 0 0       GYNE HISTORY:      History   Sexual Activity    Sexual activity: Yes    Partners: Male    Birth control/ protection: Condom                 MEDICAL HISTORY:  Past Medical History[1]    SURGICAL HISTORY:  Past Surgical History[2]    SOCIAL HISTORY:  Social History     Socioeconomic History    Marital status: Single     Spouse name: Not on file    Number of children: Not on file    Years of education: Not on file    Highest education level: Not on file   Occupational History    Not on file   Tobacco Use    Smoking status: Former     Current packs/day: 0.00     Types: Cigarettes     Quit date: 2021     Years since quittin.1     Passive exposure: Past    Smokeless tobacco: Former   Vaping Use    Vaping status: Former   Substance and Sexual Activity    Alcohol use: Not Currently     Alcohol/week: 2.0 - 3.0 standard drinks of alcohol     Types: 2 - 3 Standard drinks or equivalent per week    Drug use: Never    Sexual activity: Yes     Partners: Male     Birth control/protection: Condom   Other Topics Concern    Caffeine Concern No    Exercise Yes    Seat Belt Yes    Special Diet Yes     Comment: Gluten free    Stress Concern No    Weight Concern No   Social History Narrative    Not on file     Social Drivers of Health     Food Insecurity: Not on file   Transportation Needs: Not on file   Stress: Not on file   Housing Stability: Not on file       FAMILY HISTORY:  Family History[3]    MEDICATIONS:  Medications -  Current[4]    ALLERGIES:  Allergies[5]      PHYSICAL EXAM:   Pelvic Exam:  External Genitalia: normal appearance, hair distribution, and no lesions  Urethral Meatus:  normal in size, location, without lesions and prolapse  Bladder:  No fullness, masses or tenderness  Vagina:  Normal appearance without lesions, no abnormal discharge  Cervix:  Normal without tenderness on motion  Perineum: normal  Anus: no hemorroids     Assessment & Plan:    . Status post LEEP (loop electrosurgical excision procedure) of cervix    - Hpv High Risk , Thin Prep Collect; Future  - ThinPrep PAP Smear B; Future    2. LGSIL on Pap smear of cervix    - Hpv High Risk , Thin Prep Collect; Future  - ThinPrep PAP Smear B; Future    3. History of carcinoma in situ of cervix    - Hpv High Risk , Thin Prep Collect; Future  - ThinPrep PAP Smear B; Future             [1]   Past Medical History:   Dysmenorrhea    Papanicolaou smear of cervix with low grade squamous intraepithelial lesion (LGSIL)    Papanicolaou smear of cervix with low grade squamous intraepithelial lesion (LGSIL)    Sexually transmitted disease    Chlamydia 2021 + hsv   [2]   Past Surgical History:  Procedure Laterality Date    Colposcopy, cervix w/upper adjacent vagina; w/biopsy(s), cervix  01/30/2025    A.  Biopsy, cervix: -High grade squamous intraepithelial lesion (HSIL, moderate squamous dysplasia, CHAPO 2).   B.  Biopsy, endocervix: -Limited/scant specimen with few scant detached squamous ectocervical and endocervical glandular cells.    Leep  02/27/2025    A- Cervix, LEEP: -High-grade-squamous intraepithelial lesion (HSIL/CHAPO-2). -Ectocervical and endocervical margins positive for high-grade dysplasia/HSIL. -P16 immunostain positive for block reactivity.   B- Endocervical curetting: -Endocervical glands and mucous. -Negative for dysplasia.   [3]   Family History  Problem Relation Age of Onset    No Known Problems Mother     No Known Problems Father     Heart Disorder Maternal  Grandfather     Cancer Maternal Grandmother         Breast cancer   [4]   Current Outpatient Medications:     amphetamine-dextroamphetamine (ADDERALL) 10 MG Oral Tab, Take 1 tablet (10 mg total) by mouth 2 (two) times daily., Disp: 60 tablet, Rfl: 0    [START ON 8/9/2025] amphetamine-dextroamphetamine (ADDERALL) 10 MG Oral Tab, Take 1 tablet (10 mg total) by mouth 2 (two) times daily., Disp: 60 tablet, Rfl: 0    [START ON 9/9/2025] amphetamine-dextroamphetamine (ADDERALL) 10 MG Oral Tab, Take 1 tablet (10 mg total) by mouth 2 (two) times daily., Disp: 60 tablet, Rfl: 0    traZODone 50 MG Oral Tab, Take 0.5-2 tablets ( mg total) by mouth nightly as needed for Sleep., Disp: 60 tablet, Rfl: 0    Amphetamine-Dextroamphet ER (ADDERALL XR) 20 MG Oral Capsule SR 24 Hr, Take 1 capsule (20 mg total) by mouth daily., Disp: 30 capsule, Rfl: 0    hydroxychloroquine 200 MG Oral Tab, Take 1 tablet (200 mg total) by mouth daily., Disp: 90 tablet, Rfl: 1    triamcinolone 0.1 % External Cream, Apply twice a day for 2 weeks then take a break for 2 weeks then apply again as needed to affected area, Disp: 60 g, Rfl: 0    propranolol 10 MG Oral Tab, Take 1 tablet (10 mg total) by mouth daily as needed (presentation or interview anxiety)., Disp: 30 tablet, Rfl: 1    Meloxicam 15 MG Oral Tab, Take 1 tablet (15 mg total) by mouth daily as needed for Pain. Do NOT take with other NSAIDs, Disp: 30 tablet, Rfl: 0    Multiple Vitamins-Minerals (WOMENS MULTIVITAMIN) Oral Tab, Take 1 tablet by mouth daily., Disp: , Rfl:     Omega-3 1000 MG Oral Cap, Take 1 capsule by mouth daily., Disp: , Rfl:   [5] No Known Allergies

## 2025-07-17 LAB — HPV E6+E7 MRNA CVX QL NAA+PROBE: NEGATIVE

## 2025-07-21 ENCOUNTER — TELEPHONE (OUTPATIENT)
Age: 25
End: 2025-07-21

## 2025-07-21 NOTE — TELEPHONE ENCOUNTER
Patient's name and  verified with mother. She is aware of Benlysta denial and is requesting patient start on methotrexate. Please advise.

## 2025-07-21 NOTE — TELEPHONE ENCOUNTER
Patient's mother not on STEFANO is following up on the medication refill for methotrexate    Requesting a call back

## 2025-07-25 RX ORDER — BELIMUMAB 200 MG/ML
200 SOLUTION SUBCUTANEOUS
Qty: 4 EACH | Refills: 5 | Status: SHIPPED | OUTPATIENT
Start: 2025-07-25 | End: 2025-08-22

## 2025-07-25 NOTE — TELEPHONE ENCOUNTER
Script pended for review. Schedule teaching?    PA Approved    Prior authorization for: Benlysta    Medication form: 200 mg pen     Approval #: [id number]    Approved dates: 6/25/2025 to 7/25/2026    Pharmacy for medication: [facility]     QF-TB results: CXR 7/11/25

## 2025-07-25 NOTE — TELEPHONE ENCOUNTER
PA start    Prior authorization for: Benlysta     Medication form: 200 mg pen    Submission method: [employer]    Tracking #:    QF-TB result: Screening labs in scanning

## 2025-07-28 NOTE — TELEPHONE ENCOUNTER
Called patient verified patient name and . Scheduled nurse visit for Benlysta teaching on  @ 10 am.Patient has already been contacted by [facility] and set up delivery of medication

## (undated) DIAGNOSIS — I73.00 RAYNAUD'S DISEASE WITHOUT GANGRENE: ICD-10-CM

## (undated) DIAGNOSIS — M25.531 RIGHT WRIST PAIN: Primary | ICD-10-CM

## (undated) DIAGNOSIS — T69.1XXA CHILBLAINS, INITIAL ENCOUNTER: Primary | ICD-10-CM

## (undated) DIAGNOSIS — R76.8 POSITIVE ANA (ANTINUCLEAR ANTIBODY): ICD-10-CM

## (undated) NOTE — LETTER
Date: 4/8/2022    Patient Name: Wanda Louis          To Whom it may concern: This letter has been written at the patient's request. The above patient was seen at the [facility] for treatment of a medical condition. This patient should be excused from attending work/school from 4/9/22 through 4/23/22.     The patient may return to work/school on 4/24/22 with the following limitations none      Sincerely,        Holly Varela, DO

## (undated) NOTE — Clinical Note
Maude Dougherty, :2000    CONSENT FOR PROCEDURE/SEDATION    1. I authorize the performance upon Maude Dougherty  the following: {[facility] OFFICE PROCEDURES:2148}    2. I authorize Dr. Nancy Woodall MD (and whomever is designated as the doctor’s assistant), to perform the above-mentioned procedures.    3. If any unforeseen conditions arise during this procedure calling for additional  procedures, operations, or medications (including anesthesia and blood transfusion), I further request and authorize the doctor to do whatever he/she deems advisable in my interest.    4. I consent to the taking and reproduction of any photographs in the course of this procedure for professional purposes.    5. I consent to the administration of such sedation as may be considered necessary or advisable by the physician responsible for this service, with the exception of ______________________________________________________    6. I have been informed by my doctor of the nature and purpose of this procedure sedation, possible alternative methods of treatment, risk involved and possible complications.    7. If I have a Do Not Resuscitate (DNR) order in place, the physician and I (or the individual authorized to consent on my behalf) will discuss and agree as to whether the Do Not Resuscitate (DNR) order will remain in effect or will be discontinued during the performance of the procedure and the applicable recovery period. If the Do Not Resuscitate (DNR) order is discontinued and is to be reinstated following the procedure/recovery period, the physician will determine when the applicable recovery period ends for purposes of reinstating the Do Not Resuscitate (DNR) order.    Signature of Patient:_______________________________________________    Signature of person authorized to consent for patient:  _______________________________________________________________    Relationship to patient:  ____________________________________________    Witness: _________________________________________ Date:___________     Physician Signature: _______________________________ Date:___________

## (undated) NOTE — LETTER
Maude Dougherty, :2000    CONSENT FOR PROCEDURE/SEDATION    1. I authorize the performance upon Maude Dougherty  the following:  Colposcopy    2. I authorize Dr. Nancy Woodall MD (and whomever is designated as the doctor’s assistant), to perform the above-mentioned procedures.    3. If any unforeseen conditions arise during this procedure calling for additional  procedures, operations, or medications (including anesthesia and blood transfusion), I further request and authorize the doctor to do whatever he/she deems advisable in my interest.    4. I consent to the taking and reproduction of any photographs in the course of this procedure for professional purposes.    5. I consent to the administration of such sedation as may be considered necessary or advisable by the physician responsible for this service, with the exception of ______________________________________________________    6. I have been informed by my doctor of the nature and purpose of this procedure sedation, possible alternative methods of treatment, risk involved and possible complications.    7. If I have a Do Not Resuscitate (DNR) order in place, the physician and I (or the individual authorized to consent on my behalf) will discuss and agree as to whether the Do Not Resuscitate (DNR) order will remain in effect or will be discontinued during the performance of the procedure and the applicable recovery period. If the Do Not Resuscitate (DNR) order is discontinued and is to be reinstated following the procedure/recovery period, the physician will determine when the applicable recovery period ends for purposes of reinstating the Do Not Resuscitate (DNR) order.    Signature of Patient:_______________________________________________    Signature of person authorized to consent for patient:  _______________________________________________________________    Relationship to patient: ____________________________________________    Witness:  _________________________________________ Date:___________     Physician Signature: _______________________________ Date:___________

## (undated) NOTE — LETTER
Date: 5/10/2022    Patient Name: Rik Bosworth          To Whom it may concern: This letter has been written at the patient's request. The above patient was seen at the [facility] for treatment of a medical condition. This patient should be excused from attending work/school from *** through ***. The patient may return to work/school on *** with the following limitations ***.         Sincerely,    ALLEGRA Goodwin

## (undated) NOTE — LETTER
Maude Dougherty, :2000    CONSENT FOR PROCEDURE/SEDATION    1. I authorize the performance upon Maude Dougherty  the following:  LEEDAISY    2. I authorize Dr. Nancy Woodall MD (and whomever is designated as the doctor’s assistant), to perform the above-mentioned procedures.    3. If any unforeseen conditions arise during this procedure calling for additional  procedures, operations, or medications (including anesthesia and blood transfusion), I further request and authorize the doctor to do whatever he/she deems advisable in my interest.    4. I consent to the taking and reproduction of any photographs in the course of this procedure for professional purposes.    5. I consent to the administration of such sedation as may be considered necessary or advisable by the physician responsible for this service, with the exception of ______________________________________________________    6. I have been informed by my doctor of the nature and purpose of this procedure sedation, possible alternative methods of treatment, risk involved and possible complications.    7. If I have a Do Not Resuscitate (DNR) order in place, the physician and I (or the individual authorized to consent on my behalf) will discuss and agree as to whether the Do Not Resuscitate (DNR) order will remain in effect or will be discontinued during the performance of the procedure and the applicable recovery period. If the Do Not Resuscitate (DNR) order is discontinued and is to be reinstated following the procedure/recovery period, the physician will determine when the applicable recovery period ends for purposes of reinstating the Do Not Resuscitate (DNR) order.    Signature of Patient:_______________________________________________    Signature of person authorized to consent for patient:  _______________________________________________________________    Relationship to patient: ____________________________________________    Witness:  _________________________________________ Date:___________     Physician Signature: _______________________________ Date:___________

## (undated) NOTE — LETTER
[facility] Department of OB/GYN  After Care Instructions for LEEP      Biopsy Results   You will receive a phone call with your biopsy results in 7 business days.  If you have not received your biopsy results in 7 days, please contact our office. The results of your biopsy will determine if further treatment will be necessary.       Bleeding   After your biopsy, the area is swabbed with a brown solution to help stop any bleeding.  For this reason, you may notice a brown or black clotty discharge lasting several days.  If you experience bright red bleeding that is heavy, you should call the office.  You may have increased bleeding one week after the procedure that should last less watts 24 hours.      Restrictions    You should avoid douching, intercourse and tampon use for 1-2 weeks following your procedure.  Avoid swimming until you have no more bleeding.     Pain    If you are uncomfortable after the procedure, you may use Aleve, Tylenol or Ibuprofen for the discomfort.        If you have additional questions or concerns, please call us at [phone].